# Patient Record
Sex: FEMALE | Race: WHITE | NOT HISPANIC OR LATINO
[De-identification: names, ages, dates, MRNs, and addresses within clinical notes are randomized per-mention and may not be internally consistent; named-entity substitution may affect disease eponyms.]

---

## 2017-01-06 ENCOUNTER — APPOINTMENT (OUTPATIENT)
Dept: UROLOGY | Facility: CLINIC | Age: 82
End: 2017-01-06

## 2017-01-06 VITALS
HEART RATE: 91 BPM | WEIGHT: 170 LBS | RESPIRATION RATE: 17 BRPM | DIASTOLIC BLOOD PRESSURE: 75 MMHG | BODY MASS INDEX: 34.27 KG/M2 | TEMPERATURE: 98 F | SYSTOLIC BLOOD PRESSURE: 161 MMHG | HEIGHT: 59 IN

## 2017-01-06 DIAGNOSIS — F17.200 NICOTINE DEPENDENCE, UNSPECIFIED, UNCOMPLICATED: ICD-10-CM

## 2017-01-06 DIAGNOSIS — Z78.9 OTHER SPECIFIED HEALTH STATUS: ICD-10-CM

## 2017-01-06 DIAGNOSIS — C50.119: ICD-10-CM

## 2017-01-06 DIAGNOSIS — Z86.79 PERSONAL HISTORY OF OTHER DISEASES OF THE CIRCULATORY SYSTEM: ICD-10-CM

## 2017-01-06 DIAGNOSIS — J44.9 CHRONIC OBSTRUCTIVE PULMONARY DISEASE, UNSPECIFIED: ICD-10-CM

## 2017-08-08 ENCOUNTER — APPOINTMENT (OUTPATIENT)
Dept: UROLOGY | Facility: CLINIC | Age: 82
End: 2017-08-08

## 2018-11-11 ENCOUNTER — APPOINTMENT (OUTPATIENT)
Dept: MRI IMAGING | Facility: CLINIC | Age: 83
End: 2018-11-11

## 2018-11-11 ENCOUNTER — OUTPATIENT (OUTPATIENT)
Dept: OUTPATIENT SERVICES | Facility: HOSPITAL | Age: 83
LOS: 1 days | End: 2018-11-11
Payer: COMMERCIAL

## 2018-11-11 DIAGNOSIS — Z00.8 ENCOUNTER FOR OTHER GENERAL EXAMINATION: ICD-10-CM

## 2018-11-11 PROCEDURE — A9585: CPT

## 2018-11-11 PROCEDURE — 74183 MRI ABD W/O CNTR FLWD CNTR: CPT

## 2018-11-11 PROCEDURE — 74183 MRI ABD W/O CNTR FLWD CNTR: CPT | Mod: 26

## 2019-08-29 ENCOUNTER — APPOINTMENT (OUTPATIENT)
Dept: SURGICAL ONCOLOGY | Facility: CLINIC | Age: 84
End: 2019-08-29
Payer: MEDICARE

## 2019-08-29 VITALS
HEIGHT: 59 IN | SYSTOLIC BLOOD PRESSURE: 150 MMHG | BODY MASS INDEX: 35.28 KG/M2 | OXYGEN SATURATION: 88 % | WEIGHT: 175 LBS | DIASTOLIC BLOOD PRESSURE: 68 MMHG | RESPIRATION RATE: 15 BRPM | HEART RATE: 74 BPM

## 2019-08-29 PROCEDURE — 99204 OFFICE O/P NEW MOD 45 MIN: CPT

## 2019-08-30 ENCOUNTER — TRANSCRIPTION ENCOUNTER (OUTPATIENT)
Age: 84
End: 2019-08-30

## 2019-09-02 ENCOUNTER — FORM ENCOUNTER (OUTPATIENT)
Age: 84
End: 2019-09-02

## 2019-09-03 ENCOUNTER — APPOINTMENT (OUTPATIENT)
Dept: ULTRASOUND IMAGING | Facility: CLINIC | Age: 84
End: 2019-09-03
Payer: MEDICARE

## 2019-09-03 ENCOUNTER — APPOINTMENT (OUTPATIENT)
Dept: RADIOLOGY | Facility: CLINIC | Age: 84
End: 2019-09-03
Payer: MEDICARE

## 2019-09-03 ENCOUNTER — OUTPATIENT (OUTPATIENT)
Dept: OUTPATIENT SERVICES | Facility: HOSPITAL | Age: 84
LOS: 1 days | End: 2019-09-03
Payer: COMMERCIAL

## 2019-09-03 DIAGNOSIS — Z00.8 ENCOUNTER FOR OTHER GENERAL EXAMINATION: ICD-10-CM

## 2019-09-03 PROCEDURE — 71046 X-RAY EXAM CHEST 2 VIEWS: CPT

## 2019-09-03 PROCEDURE — 76882 US LMTD JT/FCL EVL NVASC XTR: CPT | Mod: 26,LT

## 2019-09-03 PROCEDURE — 76882 US LMTD JT/FCL EVL NVASC XTR: CPT

## 2019-09-03 PROCEDURE — 71046 X-RAY EXAM CHEST 2 VIEWS: CPT | Mod: 26

## 2019-09-06 ENCOUNTER — FORM ENCOUNTER (OUTPATIENT)
Age: 84
End: 2019-09-06

## 2019-09-07 ENCOUNTER — APPOINTMENT (OUTPATIENT)
Dept: CT IMAGING | Facility: CLINIC | Age: 84
End: 2019-09-07
Payer: MEDICARE

## 2019-09-07 ENCOUNTER — OUTPATIENT (OUTPATIENT)
Dept: OUTPATIENT SERVICES | Facility: HOSPITAL | Age: 84
LOS: 1 days | End: 2019-09-07
Payer: COMMERCIAL

## 2019-09-07 DIAGNOSIS — Z00.8 ENCOUNTER FOR OTHER GENERAL EXAMINATION: ICD-10-CM

## 2019-09-07 PROCEDURE — 82565 ASSAY OF CREATININE: CPT

## 2019-09-07 PROCEDURE — 71260 CT THORAX DX C+: CPT | Mod: 26

## 2019-09-07 PROCEDURE — 71260 CT THORAX DX C+: CPT

## 2019-09-12 ENCOUNTER — RESULT REVIEW (OUTPATIENT)
Age: 84
End: 2019-09-12

## 2019-09-12 ENCOUNTER — OUTPATIENT (OUTPATIENT)
Dept: OUTPATIENT SERVICES | Facility: HOSPITAL | Age: 84
LOS: 1 days | End: 2019-09-12

## 2019-09-12 DIAGNOSIS — C43.9 MALIGNANT MELANOMA OF SKIN, UNSPECIFIED: ICD-10-CM

## 2019-09-13 LAB — SURGICAL PATHOLOGY STUDY: SIGNIFICANT CHANGE UP

## 2019-09-18 ENCOUNTER — APPOINTMENT (OUTPATIENT)
Dept: PLASTIC SURGERY | Facility: CLINIC | Age: 84
End: 2019-09-18
Payer: MEDICARE

## 2019-09-18 VITALS
HEIGHT: 59 IN | HEART RATE: 71 BPM | SYSTOLIC BLOOD PRESSURE: 154 MMHG | DIASTOLIC BLOOD PRESSURE: 76 MMHG | RESPIRATION RATE: 15 BRPM | BODY MASS INDEX: 34.27 KG/M2 | OXYGEN SATURATION: 98 % | WEIGHT: 170 LBS

## 2019-09-18 PROCEDURE — 99204 OFFICE O/P NEW MOD 45 MIN: CPT

## 2019-09-18 RX ORDER — ASPIRIN 81 MG
81 TABLET,CHEWABLE ORAL
Refills: 0 | Status: DISCONTINUED | COMMUNITY
End: 2019-09-18

## 2019-09-18 NOTE — PHYSICAL EXAM
[de-identified] : The patient is ambulatory, well groomed, no signs of cachexia. [de-identified] : No conjunctival erythema, hyperemia, or discharge bilaterally; no ectropion, entropion, lagophthalmos, or lid malposition bilaterally. Pupils are equal, round, and reactive to light bilaterally. [de-identified] : Normocephalic, atraumatic. [de-identified] : There are no masses, scars, or lesions of the external ear. External nose is midline with no scars or masses. Gross hearing intact bilaterally (finger rub). Nasal mucosa has no edema, lesions, or signs of desiccation. Lips and gums have no masses, lesions, friability, or edema. [de-identified] : Neck is soft without edema, masses, or crepitus. Trachea midline. No thyromegaly; no palpable thyroid nodules. [de-identified] : No swelling, tenderness, or varicosities of the extremities bilaterally; extremities are warm to palpation and pedal pulses intact. [de-identified] : No sign of respiratory distress, no tachypnea, no use of accessory respiratory muscles. [de-identified] : No hepatomegaly or splenomegaly. No abdominal wall tenderness or masses on palpation. No abdominal wall hernias noted. [de-identified] : On examination, patient has normal gait and station.\par  [de-identified] : Left Lower Leg -\par there is a biopsy site with residual pigmentation noted on the anterior medial left lower leg,\par approximately 2 cm in diameter, irregular borders, appears to be raised skin lesion,\par no open areas, no bleeding, no drainage, no satellite lesions, \par no regional LAD noted.\par  [de-identified] : The patient is alert and oriented to time, place, and person. No obvious disorder of mood or affect such as anxiety or agitation. [de-identified] : CN 2-12 are intact, no sensory disturbances noted (e.g. by touch)

## 2019-09-18 NOTE — HISTORY OF PRESENT ILLNESS
[FreeTextEntry1] : This is a 84 year old female who presents to the office for an initial consultation at the request of Dr. 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 Pt reports a mole that she had for a while. A few months ago she noticed crusting and bleeding which didn't improve. This prompted medical attention. Her dermatologist Dr. Braga did a biopsy of the mole and referred her to Dr. Roman. She will be proceeding with wide-excision as well as sentinel LN biopsy with Dr. Roman and was referred to PRS for reconstructive options. \par Pt PMH pertinent for HLD, HTN, COPD. Current everyday smoker. Denies any anticoagulant use. Reports history of left knee replacement in 2006, has swelling and edema of left leg ever since. \par Otherwise no other complaints or concerns; denies fever, sweats, or chills.

## 2019-09-18 NOTE — ASSESSMENT
[FreeTextEntry1] : 84 year old female who presents to the office for an initial consultation at the request of Dr. Roman (Surgical Oncology) regarding recently diagnosed melanoma of the left leg.\par \par I explained that following excision there will be a full thickness defect of the involved area.  The reconstructive options will be based on the defect size and surrounding tissue laxity of the involved area.  Primary closure is only possible for smaller defects.  \par For larger defects, local tissue rearrangement or skin grafting may be necessary.  The patient was explained the risks of each option. Risks following layered primary closure or local tissue rearrangement include wound dehiscence, contour irregularity, bleeding, infection, and paraesthesias.  Risks following skin grafting include wound dehiscence, skin graft nonadherence (partial or complete), contour irregularity, bleeding, infection, paraesthesias, and donor site complications.  We also discussed the importance of cessation of smoking due to increased risk of wound complications following surgery. Patient and the patient's daughter fully understands this discussion. All questions were answered; the patient fully understands the risks and plan and would like to proceed with the above.\par \par Surgery to be planned with Dr. Roman, surgical paperwork to follow.

## 2019-09-18 NOTE — ADDENDUM
[FreeTextEntry1] : All medical record entries made were at my, VERONICA GIMENEZ MD, direction and personally dictated by me. I have reviewed the chart and agree that the record accurately reflects my personal performance of the history, physical exam, assessment, and plan.

## 2019-09-18 NOTE — CONSULT LETTER
[Consult Letter:] : I had the pleasure of evaluating your patient, [unfilled]. [Dear  ___] : Dear  [unfilled], [Please see my note below.] : Please see my note below. [Consult Closing:] : Thank you very much for allowing me to participate in the care of this patient.  If you have any questions, please do not hesitate to contact me. [Sincerely,] : Sincerely, [FreeTextEntry3] : Luis Singh MD

## 2019-10-04 ENCOUNTER — OUTPATIENT (OUTPATIENT)
Dept: OUTPATIENT SERVICES | Facility: HOSPITAL | Age: 84
LOS: 1 days | End: 2019-10-04
Payer: MEDICARE

## 2019-10-04 VITALS
DIASTOLIC BLOOD PRESSURE: 64 MMHG | WEIGHT: 166.01 LBS | RESPIRATION RATE: 14 BRPM | HEIGHT: 56 IN | SYSTOLIC BLOOD PRESSURE: 122 MMHG | HEART RATE: 82 BPM | TEMPERATURE: 97 F | OXYGEN SATURATION: 95 %

## 2019-10-04 DIAGNOSIS — H26.9 UNSPECIFIED CATARACT: Chronic | ICD-10-CM

## 2019-10-04 DIAGNOSIS — C43.9 MALIGNANT MELANOMA OF SKIN, UNSPECIFIED: ICD-10-CM

## 2019-10-04 DIAGNOSIS — R94.31 ABNORMAL ELECTROCARDIOGRAM [ECG] [EKG]: ICD-10-CM

## 2019-10-04 DIAGNOSIS — C50.919 MALIGNANT NEOPLASM OF UNSPECIFIED SITE OF UNSPECIFIED FEMALE BREAST: Chronic | ICD-10-CM

## 2019-10-04 DIAGNOSIS — C43.72 MALIGNANT MELANOMA OF LEFT LOWER LIMB, INCLUDING HIP: ICD-10-CM

## 2019-10-04 DIAGNOSIS — M12.9 ARTHROPATHY, UNSPECIFIED: Chronic | ICD-10-CM

## 2019-10-04 LAB
ANION GAP SERPL CALC-SCNC: 14 MMO/L — SIGNIFICANT CHANGE UP (ref 7–14)
BUN SERPL-MCNC: 22 MG/DL — SIGNIFICANT CHANGE UP (ref 7–23)
CALCIUM SERPL-MCNC: 9.7 MG/DL — SIGNIFICANT CHANGE UP (ref 8.4–10.5)
CHLORIDE SERPL-SCNC: 106 MMOL/L — SIGNIFICANT CHANGE UP (ref 98–107)
CO2 SERPL-SCNC: 25 MMOL/L — SIGNIFICANT CHANGE UP (ref 22–31)
CREAT SERPL-MCNC: 0.93 MG/DL — SIGNIFICANT CHANGE UP (ref 0.5–1.3)
GLUCOSE SERPL-MCNC: 118 MG/DL — HIGH (ref 70–99)
HCT VFR BLD CALC: 46.8 % — HIGH (ref 34.5–45)
HGB BLD-MCNC: 14.1 G/DL — SIGNIFICANT CHANGE UP (ref 11.5–15.5)
MAGNESIUM SERPL-MCNC: 2 MG/DL — SIGNIFICANT CHANGE UP (ref 1.6–2.6)
MCHC RBC-ENTMCNC: 30.1 % — LOW (ref 32–36)
MCHC RBC-ENTMCNC: 30.3 PG — SIGNIFICANT CHANGE UP (ref 27–34)
MCV RBC AUTO: 100.6 FL — HIGH (ref 80–100)
NRBC # FLD: 0 K/UL — SIGNIFICANT CHANGE UP (ref 0–0)
PHOSPHATE SERPL-MCNC: 2.5 MG/DL — SIGNIFICANT CHANGE UP (ref 2.5–4.5)
PLATELET # BLD AUTO: 273 K/UL — SIGNIFICANT CHANGE UP (ref 150–400)
PMV BLD: 9.7 FL — SIGNIFICANT CHANGE UP (ref 7–13)
POTASSIUM SERPL-MCNC: 4.1 MMOL/L — SIGNIFICANT CHANGE UP (ref 3.5–5.3)
POTASSIUM SERPL-SCNC: 4.1 MMOL/L — SIGNIFICANT CHANGE UP (ref 3.5–5.3)
RBC # BLD: 4.65 M/UL — SIGNIFICANT CHANGE UP (ref 3.8–5.2)
RBC # FLD: 13.7 % — SIGNIFICANT CHANGE UP (ref 10.3–14.5)
SODIUM SERPL-SCNC: 145 MMOL/L — SIGNIFICANT CHANGE UP (ref 135–145)
WBC # BLD: 10.57 K/UL — HIGH (ref 3.8–10.5)
WBC # FLD AUTO: 10.57 K/UL — HIGH (ref 3.8–10.5)

## 2019-10-04 PROCEDURE — 93010 ELECTROCARDIOGRAM REPORT: CPT

## 2019-10-04 RX ORDER — SODIUM CHLORIDE 9 MG/ML
1000 INJECTION, SOLUTION INTRAVENOUS
Refills: 0 | Status: DISCONTINUED | OUTPATIENT
Start: 2019-10-18 | End: 2019-11-04

## 2019-10-04 NOTE — H&P PST ADULT - NSICDXPASTMEDICALHX_GEN_ALL_CORE_FT
PAST MEDICAL HISTORY:  Emphysema lung COPD    Hypercholesterolemia     Hypertension     Melanoma     Miscarriage x 2 PAST MEDICAL HISTORY:  Arthritis right knee and shoulders    Emphysema lung COPD    Hypercholesterolemia     Hypertension     Melanoma     Miscarriage x 2

## 2019-10-04 NOTE — H&P PST ADULT - HISTORY OF PRESENT ILLNESS
This is an 83 y/o female who presents with left calf mass. Recently it became symptomatic with crusting and bleeding. Visited MD with subsequent biopsy revealing melanoma. Scheduled for wide excision left calf melanoma left groin sentinel LN biopsy on 10-18-19

## 2019-10-04 NOTE — H&P PST ADULT - ATTENDING COMMENTS
07-Aug-2018 07:23
84-year-old lady with an intermediate thickness melanoma of the upper inner left calf scheduled for appropriate excision, with sentinel node biopsy, and reconstruction.    Her diagnosis was reviewed with her and her daughter in my office, again with them and her son on the morning of operation.    All questions answered, consent on chart

## 2019-10-04 NOTE — H&P PST ADULT - NSICDXPROBLEM_GEN_ALL_CORE_FT
PROBLEM DIAGNOSES  Problem: Melanoma  Assessment and Plan: This is an 83 y/o female who is scheduled for wide excision left calf melanoma, left groin sentinel LN biopsy on 10-18-19    Problem: Abnormal EKG  Assessment and Plan: Await medical evaluation with pcp due to advanced age, abnormal ekg and patient's pulse oximetry 95%  * Await old ekg for comparison from pcp  * Instructed to take normal am dose of   the am of surgery PROBLEM DIAGNOSES  Problem: Abnormal EKG  Assessment and Plan: Await medical evaluation with pcp due to advanced age, abnormal ekg and patient's pulse oximetry 95%  * Await old ekg for comparison from pcp  * Instructed to take normal am dose of ramipril, amlodipine, Symbicort  the am of surgery    Problem: Melanoma  Assessment and Plan: This is an 83 y/o female who is scheduled for wide excision left calf melanoma, left groin sentinel LN biopsy on 10-18-19

## 2019-10-04 NOTE — H&P PST ADULT - NSICDXPASTSURGICALHX_GEN_ALL_CORE_FT
PAST SURGICAL HISTORY:  Arthropathy left knee replacement in 2006    Bilateral cataracts with lenses in 2009    Breast cancer left  breast -- lumpectomy in 2004 -- received RT after surgery -- no chemotherapy and taking arimadex for 5 years

## 2019-10-17 ENCOUNTER — TRANSCRIPTION ENCOUNTER (OUTPATIENT)
Age: 84
End: 2019-10-17

## 2019-10-17 ENCOUNTER — FORM ENCOUNTER (OUTPATIENT)
Age: 84
End: 2019-10-17

## 2019-10-17 NOTE — ASU PATIENT PROFILE, ADULT - PMH
Arthritis  right knee and shoulders  Emphysema lung  COPD  Hypercholesterolemia    Hypertension    Melanoma    Miscarriage  x 2

## 2019-10-17 NOTE — ASU PATIENT PROFILE, ADULT - PSH
Arthropathy  left knee replacement in 2006  Bilateral cataracts  with lenses in 2009  Breast cancer  left  breast -- lumpectomy in 2004 -- received RT after surgery -- no chemotherapy and taking arimadex for 5 years

## 2019-10-18 ENCOUNTER — APPOINTMENT (OUTPATIENT)
Dept: NUCLEAR MEDICINE | Facility: HOSPITAL | Age: 84
End: 2019-10-18

## 2019-10-18 ENCOUNTER — OUTPATIENT (OUTPATIENT)
Dept: OUTPATIENT SERVICES | Facility: HOSPITAL | Age: 84
LOS: 1 days | Discharge: HOME | End: 2019-10-18
Payer: COMMERCIAL

## 2019-10-18 ENCOUNTER — APPOINTMENT (OUTPATIENT)
Dept: SURGICAL ONCOLOGY | Facility: HOSPITAL | Age: 84
End: 2019-10-18

## 2019-10-18 ENCOUNTER — RESULT REVIEW (OUTPATIENT)
Age: 84
End: 2019-10-18

## 2019-10-18 VITALS
DIASTOLIC BLOOD PRESSURE: 62 MMHG | SYSTOLIC BLOOD PRESSURE: 126 MMHG | HEART RATE: 76 BPM | RESPIRATION RATE: 22 BRPM | OXYGEN SATURATION: 93 %

## 2019-10-18 VITALS
OXYGEN SATURATION: 96 % | WEIGHT: 166.01 LBS | DIASTOLIC BLOOD PRESSURE: 56 MMHG | HEIGHT: 56 IN | TEMPERATURE: 98 F | RESPIRATION RATE: 15 BRPM | HEART RATE: 69 BPM | SYSTOLIC BLOOD PRESSURE: 152 MMHG

## 2019-10-18 DIAGNOSIS — C50.919 MALIGNANT NEOPLASM OF UNSPECIFIED SITE OF UNSPECIFIED FEMALE BREAST: Chronic | ICD-10-CM

## 2019-10-18 DIAGNOSIS — C43.72 MALIGNANT MELANOMA OF LEFT LOWER LIMB, INCLUDING HIP: ICD-10-CM

## 2019-10-18 DIAGNOSIS — M12.9 ARTHROPATHY, UNSPECIFIED: Chronic | ICD-10-CM

## 2019-10-18 DIAGNOSIS — H26.9 UNSPECIFIED CATARACT: Chronic | ICD-10-CM

## 2019-10-18 PROCEDURE — 78195 LYMPH SYSTEM IMAGING: CPT | Mod: 26

## 2019-10-18 PROCEDURE — 15221 FTH/GFT FR S/A/L EACH ADDL: CPT

## 2019-10-18 PROCEDURE — 15220 FTH/GFT FR S/A/L 20 SQ CM/<: CPT

## 2019-10-18 PROCEDURE — 88342 IMHCHEM/IMCYTCHM 1ST ANTB: CPT | Mod: 26

## 2019-10-18 PROCEDURE — 88307 TISSUE EXAM BY PATHOLOGIST: CPT | Mod: 26

## 2019-10-18 PROCEDURE — 88341 IMHCHEM/IMCYTCHM EA ADD ANTB: CPT | Mod: 26

## 2019-10-18 PROCEDURE — 13101 CMPLX RPR TRUNK 2.6-7.5 CM: CPT | Mod: 59

## 2019-10-18 PROCEDURE — 38500 BIOPSY/REMOVAL LYMPH NODES: CPT | Mod: LT

## 2019-10-18 PROCEDURE — 38792 RA TRACER ID OF SENTINL NODE: CPT | Mod: 59,LT

## 2019-10-18 PROCEDURE — 11606 EXC TR-EXT MAL+MARG >4 CM: CPT

## 2019-10-18 PROCEDURE — 88305 TISSUE EXAM BY PATHOLOGIST: CPT | Mod: 26

## 2019-10-18 RX ORDER — FENTANYL CITRATE 50 UG/ML
25 INJECTION INTRAVENOUS
Refills: 0 | Status: DISCONTINUED | OUTPATIENT
Start: 2019-10-18 | End: 2019-10-18

## 2019-10-18 RX ORDER — METOCLOPRAMIDE HCL 10 MG
10 TABLET ORAL ONCE
Refills: 0 | Status: DISCONTINUED | OUTPATIENT
Start: 2019-10-18 | End: 2019-11-04

## 2019-10-18 NOTE — ASU DISCHARGE PLAN (ADULT/PEDIATRIC) - CARE PROVIDER_API CALL
Luis Singh (MD)  ColonRectal Surgery; Plastic Surgery; Surgery; Surgery of the Hand  [address] [place] [address], [address] [address]  Phone: [phone]  Fax: [phone]  Follow Up Time: 1 week    Yunier Roman)  Surgery  [address] [place] [address]  Phone: [phone]  Fax: [phone]  Follow Up Time: 2 weeks

## 2019-10-18 NOTE — BRIEF OPERATIVE NOTE - NSICDXBRIEFPROCEDURE_GEN_ALL_CORE_FT
PROCEDURES:  Complex repair of skin of lower extremity 2.6 cm to 7.5 cm 18-Oct-2019 19:13:34  Rick Hopkins  Complex repair of trunk, each additional 5 cm or less 18-Oct-2019 19:13:13  Rick Hopkins  Repair, trunk, complex, each additional 5 cm or less 18-Oct-2019 19:12:35  Rick Hopkins  Complex repair, wound, torso, inital 7.5 cm, followed by additional 5.0 cm repair 18-Oct-2019 19:12:17  Rick Hopkins  Full-thickness skin graft to left lower extremity 18-Oct-2019 19:10:22  Rick Hopkins  Excision, lesion, malignant, torso, upper extremity, or lower extremity, greater than 4.0 cm in diameter 18-Oct-2019 19:09:53  Rick Hopkins

## 2019-10-18 NOTE — ASU DISCHARGE PLAN (ADULT/PEDIATRIC) - ASU DC SPECIAL INSTRUCTIONSFT
Initial followup with plastic surgery within the next week or 2.    Dr. Roman should call with pathology report approximately 14 days, that conversation will determine further management Initial followup with plastic surgery within the next week or 2.    Dr. Roman should call with pathology report approximately 14 days, that conversation will determine further management    Resume normal diet. Avoid straining, exercise, or heavy lifting.    Take medications as instructed by prescriptions.    Shower/rinse with warm/soapy water, pat dry (NO scrubbing or rubbing). Keep left lower extremity ace wrap in place and dry. Do not remove. Exclude from shower.    Follow-up with primary care doctor as well.

## 2019-10-18 NOTE — ASU DISCHARGE PLAN (ADULT/PEDIATRIC) - NURSING INSTRUCTIONS
Advance to regular diet as tolerated, stay well hydrated.    You received Tylenol (acetaminophen) in the OR.  Do not take Tylenol or any medications that contain Tylenol until after Midnight tonight. Advance to regular diet as tolerated, stay well hydrated.    You received Tylenol (acetaminophen) in the OR.  Do not take Tylenol or any medications that contain Tylenol until after Midnight tonight

## 2019-10-18 NOTE — ASU DISCHARGE PLAN (ADULT/PEDIATRIC) - PROVIDER TOKENS
PROVIDER:[TOKEN:[[id number]],FOLLOWUP:[1 week]],PROVIDER:[TOKEN:[[id number]],FOLLOWUP:[2 weeks]]

## 2019-10-18 NOTE — CHART NOTE - NSCHARTNOTEFT_GEN_A_CORE
Surgery Preop Note    Patient is a 84y old  Female who presents with a chief complaint of   Diagnosis: left calf melanoma T3b. 3.22mm thickness without regression changes   Procedure: WIDE EXCISION LEFT CALF MELANOMA, LEFT Cho  GROIN SENTINEL LYMPH NODE BIOPSY DR GIMENEZ  RECONSTRUCTION OF LEFT LOWER LEG WOUND,  CLOSURE OF LEFT GROIN WOUND, POSSIBLE SKIN  GRAFT, POSSIBLE LOCAL FLAP  Surgeon: Dr. Roman/Dr. Gimenez    Vital Signs Last 24 Hrs  T(C): 36.6 (18 Oct 2019 08:49), Max: 36.6 (18 Oct 2019 08:49)  T(F): 97.9 (18 Oct 2019 08:49), Max: 97.9 (18 Oct 2019 08:49)  HR: 69 (18 Oct 2019 08:49) (69 - 69)  BP: 152/56 (18 Oct 2019 08:49) (152/56 - 152/56)  BP(mean): --  RR: 15 (18 Oct 2019 08:49) (15 - 15)  SpO2: 96% (18 Oct 2019 08:49) (96% - 96%)    Exam:  Gen: NAD, resting in bed, alert and responding appropriately  Resp: Airway patent, non-labored respirations  Neuro: AAOx3  LLE: approx. 2cm raised lesion of left upper calf with ulceration       Assessment & Plan:  84y Female with left calf melanoma T3b. 3.22mm thickness without regression changes     - wide local excision with 2cm margins and sentinal node biopsy   - soft tissue reconstruction with plastic surgery

## 2019-10-18 NOTE — BRIEF OPERATIVE NOTE - OPERATION/FINDINGS
3.2 mm melanoma of the upper inner left calf Excised with a minimum 2 cm margin, inguinal sentinel node biopsy, and reconstruction.

## 2019-10-18 NOTE — ASU DISCHARGE PLAN (ADULT/PEDIATRIC) - FOLLOW UP APPOINTMENTS
Dr Roman: [phone] JEREMY TORREZ (Adult):/ [phone]  may also call Recovery Room (PACU)  @ [phone]

## 2019-10-18 NOTE — ASU DISCHARGE PLAN (ADULT/PEDIATRIC) - CALL YOUR DOCTOR IF YOU HAVE ANY OF THE FOLLOWING:
Increased irritability or sluggishness/Excessive diarrhea/Numbness, tingling, color or temperature change to extremity/Nausea and vomiting that does not stop/Unable to urinate/Inability to tolerate liquids or foods/Pain not relieved by Medications/Wound/Surgical Site with redness, or foul smelling discharge or pus/Bleeding that does not stop/Swelling that gets worse Nausea and vomiting that does not stop/Unable to urinate/Wound/Surgical Site with redness, or foul smelling discharge or pus/Increased irritability or sluggishness/Inability to tolerate liquids or foods/Excessive diarrhea/Numbness, tingling, color or temperature change to extremity/Pain not relieved by Medications/Bleeding that does not stop/Swelling that gets worse/Fever greater than (need to indicate Fahrenheit or Celsius)

## 2019-10-18 NOTE — PACU DISCHARGE NOTE - PAIN:
----- Message from Jodee Boo sent at 1/4/2019 10:29 AM CST -----  Contact: Pt:[phone]  .Needs Advice    Reason for call:Pt called and states he would like to speak with Stefani RN in regards to some questions he has.         Communication Preference:Pt:[phone]    Additional Information:    
Called pt and scheduled his appts per his request. Pt verbalized understanding to all.  
Controlled with current regime

## 2019-10-22 PROBLEM — C43.9 MALIGNANT MELANOMA OF SKIN, UNSPECIFIED: Chronic | Status: ACTIVE | Noted: 2019-10-04

## 2019-10-22 PROBLEM — M19.90 UNSPECIFIED OSTEOARTHRITIS, UNSPECIFIED SITE: Chronic | Status: ACTIVE | Noted: 2019-10-04

## 2019-10-22 PROBLEM — J43.9 EMPHYSEMA, UNSPECIFIED: Chronic | Status: ACTIVE | Noted: 2019-10-04

## 2019-10-22 PROBLEM — O03.9 COMPLETE OR UNSPECIFIED SPONTANEOUS ABORTION WITHOUT COMPLICATION: Chronic | Status: ACTIVE | Noted: 2019-10-04

## 2019-10-22 PROBLEM — E78.00 PURE HYPERCHOLESTEROLEMIA, UNSPECIFIED: Chronic | Status: ACTIVE | Noted: 2019-10-04

## 2019-10-23 ENCOUNTER — APPOINTMENT (OUTPATIENT)
Dept: PLASTIC SURGERY | Facility: CLINIC | Age: 84
End: 2019-10-23
Payer: MEDICARE

## 2019-10-23 PROCEDURE — 99024 POSTOP FOLLOW-UP VISIT: CPT

## 2019-10-29 ENCOUNTER — APPOINTMENT (OUTPATIENT)
Dept: PLASTIC SURGERY | Facility: CLINIC | Age: 84
End: 2019-10-29
Payer: MEDICARE

## 2019-10-29 PROCEDURE — 99024 POSTOP FOLLOW-UP VISIT: CPT

## 2019-11-06 LAB — SURGICAL PATHOLOGY STUDY: SIGNIFICANT CHANGE UP

## 2019-11-07 ENCOUNTER — APPOINTMENT (OUTPATIENT)
Dept: PLASTIC SURGERY | Facility: CLINIC | Age: 84
End: 2019-11-07
Payer: MEDICARE

## 2019-11-07 PROCEDURE — 99024 POSTOP FOLLOW-UP VISIT: CPT

## 2019-11-14 ENCOUNTER — APPOINTMENT (OUTPATIENT)
Dept: PLASTIC SURGERY | Facility: CLINIC | Age: 84
End: 2019-11-14

## 2019-11-14 NOTE — REASON FOR VISIT
[Post Op: _________] : a [unfilled] post op visit [FreeTextEntry1] : DOS: 10/18/2019 s/p LLE FTSG reconstruction following melanoma excision. Pt states she is doing better with time.

## 2019-11-21 ENCOUNTER — APPOINTMENT (OUTPATIENT)
Dept: PLASTIC SURGERY | Facility: CLINIC | Age: 84
End: 2019-11-21
Payer: MEDICARE

## 2019-11-21 PROCEDURE — 99024 POSTOP FOLLOW-UP VISIT: CPT

## 2019-12-13 ENCOUNTER — APPOINTMENT (OUTPATIENT)
Dept: PLASTIC SURGERY | Facility: CLINIC | Age: 84
End: 2019-12-13

## 2019-12-13 ENCOUNTER — APPOINTMENT (OUTPATIENT)
Dept: PLASTIC SURGERY | Facility: CLINIC | Age: 84
End: 2019-12-13
Payer: MEDICARE

## 2019-12-13 PROCEDURE — 99024 POSTOP FOLLOW-UP VISIT: CPT

## 2020-01-07 ENCOUNTER — APPOINTMENT (OUTPATIENT)
Dept: PLASTIC SURGERY | Facility: CLINIC | Age: 85
End: 2020-01-07
Payer: MEDICARE

## 2020-01-07 PROCEDURE — 99024 POSTOP FOLLOW-UP VISIT: CPT

## 2020-01-13 ENCOUNTER — FORM ENCOUNTER (OUTPATIENT)
Age: 85
End: 2020-01-13

## 2020-01-14 ENCOUNTER — OUTPATIENT (OUTPATIENT)
Dept: OUTPATIENT SERVICES | Facility: HOSPITAL | Age: 85
LOS: 1 days | End: 2020-01-14
Payer: COMMERCIAL

## 2020-01-14 ENCOUNTER — APPOINTMENT (OUTPATIENT)
Dept: CT IMAGING | Facility: CLINIC | Age: 85
End: 2020-01-14
Payer: MEDICARE

## 2020-01-14 DIAGNOSIS — C43.72 MALIGNANT MELANOMA OF LEFT LOWER LIMB, INCLUDING HIP: ICD-10-CM

## 2020-01-14 DIAGNOSIS — M12.9 ARTHROPATHY, UNSPECIFIED: Chronic | ICD-10-CM

## 2020-01-14 DIAGNOSIS — C50.919 MALIGNANT NEOPLASM OF UNSPECIFIED SITE OF UNSPECIFIED FEMALE BREAST: Chronic | ICD-10-CM

## 2020-01-14 DIAGNOSIS — H26.9 UNSPECIFIED CATARACT: Chronic | ICD-10-CM

## 2020-01-14 PROCEDURE — 71250 CT THORAX DX C-: CPT | Mod: 26

## 2020-01-14 PROCEDURE — 71250 CT THORAX DX C-: CPT

## 2020-01-23 ENCOUNTER — FORM ENCOUNTER (OUTPATIENT)
Age: 85
End: 2020-01-23

## 2020-01-24 ENCOUNTER — OUTPATIENT (OUTPATIENT)
Dept: OUTPATIENT SERVICES | Facility: HOSPITAL | Age: 85
LOS: 1 days | End: 2020-01-24
Payer: COMMERCIAL

## 2020-01-24 ENCOUNTER — APPOINTMENT (OUTPATIENT)
Dept: NUCLEAR MEDICINE | Facility: CLINIC | Age: 85
End: 2020-01-24
Payer: MEDICARE

## 2020-01-24 DIAGNOSIS — M12.9 ARTHROPATHY, UNSPECIFIED: Chronic | ICD-10-CM

## 2020-01-24 DIAGNOSIS — C50.919 MALIGNANT NEOPLASM OF UNSPECIFIED SITE OF UNSPECIFIED FEMALE BREAST: Chronic | ICD-10-CM

## 2020-01-24 DIAGNOSIS — H26.9 UNSPECIFIED CATARACT: Chronic | ICD-10-CM

## 2020-01-24 DIAGNOSIS — C43.72 MALIGNANT MELANOMA OF LEFT LOWER LIMB, INCLUDING HIP: ICD-10-CM

## 2020-01-24 PROCEDURE — 78816 PET IMAGE W/CT FULL BODY: CPT

## 2020-01-24 PROCEDURE — A9552: CPT

## 2020-01-24 PROCEDURE — 78816 PET IMAGE W/CT FULL BODY: CPT | Mod: 26,PS

## 2020-01-27 NOTE — PHYSICAL EXAM
[Normal] : supple, no neck mass and thyroid not enlarged [Normal Neck Lymph Nodes] : normal neck lymph nodes  [Normal Supraclavicular Lymph Nodes] : normal supraclavicular lymph nodes [Normal Axillary Lymph Nodes] : normal axillary lymph nodes [Normal Groin Lymph Nodes] : normal groin lymph nodes [Normal] : full range of motion and no deformities appreciated [de-identified] : Below

## 2020-01-27 NOTE — HISTORY OF PRESENT ILLNESS
[de-identified] : 84-year-old lady referred by her dermatologist Dr. Sanjana CANAS with an ulcerated, > 3.2 mm melanoma of the LEFT inner LEG.\par \par Concomitant dorsal right hand in situ [facility] va be rx'd with Mohs by Dr Miky Parks\par \par No other prior personal hx of skin cancer\par \par +LEFT BREAST CANCER - 2002; breast cons op by Dr Christa Marion @[facility]\par +XRT @[facility]\par +Arimidex x 5 yr, Dr Nayak @[facility]\par \par No other personal hx of malignancy\par \par The only relative with a history of cancer is a son with lymphoma\par \par PMD:\par Dr Yolanda SHIN\par \par +COPD\par She uses Ventolin, Albuterol and Symbicort inhalers - NO pulm.\par \par .\par Blood pressure controlled with amlodipine and ramipril.\par Lovastatin for hyperlipidemia. - no cardiol\par \par +L. TKR\par \par Last visit to gynecologist ~2014, does not recall the doctor's name.\par \par Summer 2019 mammogram was reportedly normal\par + Personal history of breast cancer (above)\par \par Most recent eye examination: ~2017, again does not recall the name.\par 2002: [facility] cataracts\par \par Last colonoscopy was at ~age 80.\par \par \par \par 2017: 3.2 cm left adrenal mass diagnosed on imaging @[facility].\par In retrospect, present and unchanged on an abdominal MRI from August 2011.\par Seen by Dr. Omari Walker (urology), clinical followup recommended...................\par Endocrine: Dr Sandrine QUEVEDO\par \par \par \par

## 2020-01-27 NOTE — REASON FOR VISIT
[Initial Consultation] : an initial consultation for [Other: _____] : [unfilled] [FreeTextEntry2] : Intermediate thickness melanoma left leg

## 2020-01-27 NOTE — REVIEW OF SYSTEMS
[Negative] : Endocrine [FreeTextEntry3] : Cataract [FreeTextEntry5] : hypertension [FreeTextEntry6] : COPD [FreeTextEntry9] : adrenal mass [de-identified] : Arthritis [de-identified] : Melanoma [FreeTextEntry1] : breast cancer

## 2020-01-27 NOTE — ASSESSMENT
[FreeTextEntry1] : Discussion regarding newly diagnosed ulcerated, it is normal, left leg.\par \par Extent of disease evaluation should include:\par Chest x-ray.\par Left inguinal ultrasound.\par They agree, and understand; prescriptions entered\par \par Appropriate treatment would be excision with 2 cm margins, sentinel node biopsy, coordinated with plastic surgery.\par They would like to proceed with operation, paperwork submitted\par \par Reviewed in detail, all questions answered.\par \par (09-04-19:\par Patient and I spoke.\par #1. Left inguinal ultrasound yesterday shows no adenopathy.\par #2. Accompanying chest x-ray demonstrates a right hilar prominence, AND a 1.5 cm nodular opacity projecting over the posterior inferior heart on the lateral image.\par #3. CT chest recommended, with IV contrast, to clarify above.\par She understands and agrees.\par Prescription entered).\par \par (09-11-19.\par We spoke.\par September 7, 2019, she had a CT chest at [facility] to evaluate findings on her preoperative chest x-ray.\par Results:\par #1. Patchy ground-glass opacity in the RUL, 3 month followup recommended.\par #2. 0.6 cm solid nodule, RML, indeterminate.\par I suggested a thoracic surgery evaluation, given her current melanoma diagnosis, and history of breast cancer.\par She does not want to pursue this avenue, or pulmonary medicine.\par She wants to wait until January 2020 to repeat the CT chest.\par Prescription entered)\par \par (11-20-90.\par We spoke.\par October 18, 2019, she had wide excision of a 3.2 mm ulcerated melanoma from her upper and her left calf with negative margins, negative sentinel node biopsy, and reconstruction by Dr. Singh.\par She's requiring weekly visits to plastic surgery for the calf, but is healing gradually.\par No further intervention presently warranted.\par My office will call to schedule a postoperative visit.\par Note dictated).\par \par \par \par Note dictated\par \par \par 01-16-20.\par Her January 14, 2016, followup CT chest @[facility]:\par From September 2019, right upper lobe nodule which was 1.1 cm x 1.4 cm.\par Stable 5 mm nodule in the right middle lobe.\par Given her history of melanoma, I am arranging for a PET scan, prescription entered.\par I will call her daughter tomorrow.\par \par \par 01-27-20:\par Her daughter (Rosina Feng: [phone]) and I spoke.\par January 24, 2020 PET scan describes the right upper lobe nodule as a 1.4 cm groundglass nodule, slightly increased compared to 2016, possible low-grade primary lung malignancy.\par I am arranging for thoracic surgery consultation for further evaluation, nurses contacted

## 2020-02-06 ENCOUNTER — APPOINTMENT (OUTPATIENT)
Dept: PLASTIC SURGERY | Facility: CLINIC | Age: 85
End: 2020-02-06
Payer: MEDICARE

## 2020-02-06 PROCEDURE — 99211 OFF/OP EST MAY X REQ PHY/QHP: CPT

## 2020-02-07 NOTE — REVIEW OF SYSTEMS
[As Noted in HPI] : as noted in HPI [FreeTextEntry9] : history of left knee replacement [Negative] : Heme/Lymph

## 2020-03-05 ENCOUNTER — APPOINTMENT (OUTPATIENT)
Dept: PLASTIC SURGERY | Facility: CLINIC | Age: 85
End: 2020-03-05
Payer: MEDICARE

## 2020-03-05 DIAGNOSIS — C43.72 MALIGNANT MELANOMA OF LEFT LOWER LIMB, INCLUDING HIP: ICD-10-CM

## 2020-03-05 DIAGNOSIS — T14.8XXA OTHER INJURY OF UNSPECIFIED BODY REGION, INITIAL ENCOUNTER: ICD-10-CM

## 2020-03-05 PROCEDURE — 99212 OFFICE O/P EST SF 10 MIN: CPT

## 2020-03-05 NOTE — REVIEW OF SYSTEMS
[As Noted in HPI] : as noted in HPI [Negative] : Heme/Lymph [FreeTextEntry9] : history of left knee replacement

## 2020-05-07 ENCOUNTER — INPATIENT (INPATIENT)
Facility: HOSPITAL | Age: 85
LOS: 1 days | Discharge: HOME | DRG: 66 | End: 2020-05-09
Attending: FAMILY MEDICINE | Admitting: FAMILY MEDICINE
Payer: COMMERCIAL

## 2020-05-07 VITALS
OXYGEN SATURATION: 93 % | TEMPERATURE: 98 F | RESPIRATION RATE: 20 BRPM | DIASTOLIC BLOOD PRESSURE: 76 MMHG | WEIGHT: 149.91 LBS | HEART RATE: 87 BPM | SYSTOLIC BLOOD PRESSURE: 134 MMHG

## 2020-05-07 DIAGNOSIS — C50.919 MALIGNANT NEOPLASM OF UNSPECIFIED SITE OF UNSPECIFIED FEMALE BREAST: Chronic | ICD-10-CM

## 2020-05-07 DIAGNOSIS — M12.9 ARTHROPATHY, UNSPECIFIED: Chronic | ICD-10-CM

## 2020-05-07 DIAGNOSIS — H26.9 UNSPECIFIED CATARACT: Chronic | ICD-10-CM

## 2020-05-07 DIAGNOSIS — C43.9 MALIGNANT MELANOMA OF SKIN, UNSPECIFIED: ICD-10-CM

## 2020-05-07 DIAGNOSIS — I63.9 CEREBRAL INFARCTION, UNSPECIFIED: ICD-10-CM

## 2020-05-07 DIAGNOSIS — Z29.9 ENCOUNTER FOR PROPHYLACTIC MEASURES, UNSPECIFIED: ICD-10-CM

## 2020-05-07 DIAGNOSIS — E78.00 PURE HYPERCHOLESTEROLEMIA, UNSPECIFIED: ICD-10-CM

## 2020-05-07 DIAGNOSIS — J43.9 EMPHYSEMA, UNSPECIFIED: ICD-10-CM

## 2020-05-07 DIAGNOSIS — I10 ESSENTIAL (PRIMARY) HYPERTENSION: ICD-10-CM

## 2020-05-07 LAB
ALBUMIN SERPL ELPH-MCNC: 3.2 G/DL — LOW (ref 3.3–5)
ALP SERPL-CCNC: 94 U/L — SIGNIFICANT CHANGE UP (ref 40–120)
ALT FLD-CCNC: 16 U/L — SIGNIFICANT CHANGE UP (ref 12–78)
ANION GAP SERPL CALC-SCNC: 2 MMOL/L — LOW (ref 5–17)
APPEARANCE UR: ABNORMAL
AST SERPL-CCNC: 14 U/L — LOW (ref 15–37)
BACTERIA # UR AUTO: ABNORMAL
BASOPHILS # BLD AUTO: 0.08 K/UL — SIGNIFICANT CHANGE UP (ref 0–0.2)
BASOPHILS NFR BLD AUTO: 1.1 % — SIGNIFICANT CHANGE UP (ref 0–2)
BILIRUB SERPL-MCNC: 0.4 MG/DL — SIGNIFICANT CHANGE UP (ref 0.2–1.2)
BILIRUB UR-MCNC: NEGATIVE — SIGNIFICANT CHANGE UP
BUN SERPL-MCNC: 18 MG/DL — SIGNIFICANT CHANGE UP (ref 7–23)
CALCIUM SERPL-MCNC: 8.9 MG/DL — SIGNIFICANT CHANGE UP (ref 8.5–10.1)
CHLORIDE SERPL-SCNC: 107 MMOL/L — SIGNIFICANT CHANGE UP (ref 96–108)
CO2 SERPL-SCNC: 32 MMOL/L — HIGH (ref 22–31)
COLOR SPEC: YELLOW — SIGNIFICANT CHANGE UP
COMMENT - URINE: SIGNIFICANT CHANGE UP
CREAT SERPL-MCNC: 0.88 MG/DL — SIGNIFICANT CHANGE UP (ref 0.5–1.3)
DIFF PNL FLD: ABNORMAL
EOSINOPHIL # BLD AUTO: 0.06 K/UL — SIGNIFICANT CHANGE UP (ref 0–0.5)
EOSINOPHIL NFR BLD AUTO: 0.8 % — SIGNIFICANT CHANGE UP (ref 0–6)
EPI CELLS # UR: SIGNIFICANT CHANGE UP
GLUCOSE SERPL-MCNC: 96 MG/DL — SIGNIFICANT CHANGE UP (ref 70–99)
GLUCOSE UR QL: NEGATIVE — SIGNIFICANT CHANGE UP
HCT VFR BLD CALC: 47.9 % — HIGH (ref 34.5–45)
HGB BLD-MCNC: 15 G/DL — SIGNIFICANT CHANGE UP (ref 11.5–15.5)
HYALINE CASTS # UR AUTO: ABNORMAL /LPF
IMM GRANULOCYTES NFR BLD AUTO: 0.3 % — SIGNIFICANT CHANGE UP (ref 0–1.5)
KETONES UR-MCNC: NEGATIVE — SIGNIFICANT CHANGE UP
LEUKOCYTE ESTERASE UR-ACNC: NEGATIVE — SIGNIFICANT CHANGE UP
LYMPHOCYTES # BLD AUTO: 1.75 K/UL — SIGNIFICANT CHANGE UP (ref 1–3.3)
LYMPHOCYTES # BLD AUTO: 24.4 % — SIGNIFICANT CHANGE UP (ref 13–44)
MCHC RBC-ENTMCNC: 31.3 GM/DL — LOW (ref 32–36)
MCHC RBC-ENTMCNC: 31.5 PG — SIGNIFICANT CHANGE UP (ref 27–34)
MCV RBC AUTO: 100.6 FL — HIGH (ref 80–100)
MONOCYTES # BLD AUTO: 0.43 K/UL — SIGNIFICANT CHANGE UP (ref 0–0.9)
MONOCYTES NFR BLD AUTO: 6 % — SIGNIFICANT CHANGE UP (ref 2–14)
NEUTROPHILS # BLD AUTO: 4.84 K/UL — SIGNIFICANT CHANGE UP (ref 1.8–7.4)
NEUTROPHILS NFR BLD AUTO: 67.4 % — SIGNIFICANT CHANGE UP (ref 43–77)
NITRITE UR-MCNC: NEGATIVE — SIGNIFICANT CHANGE UP
NRBC # BLD: 0 /100 WBCS — SIGNIFICANT CHANGE UP (ref 0–0)
PH UR: 5 — SIGNIFICANT CHANGE UP (ref 5–8)
PLATELET # BLD AUTO: 219 K/UL — SIGNIFICANT CHANGE UP (ref 150–400)
POTASSIUM SERPL-MCNC: 4.5 MMOL/L — SIGNIFICANT CHANGE UP (ref 3.5–5.3)
POTASSIUM SERPL-SCNC: 4.5 MMOL/L — SIGNIFICANT CHANGE UP (ref 3.5–5.3)
PROT SERPL-MCNC: 7.2 G/DL — SIGNIFICANT CHANGE UP (ref 6–8.3)
PROT UR-MCNC: 30 MG/DL
RBC # BLD: 4.76 M/UL — SIGNIFICANT CHANGE UP (ref 3.8–5.2)
RBC # FLD: 15.3 % — HIGH (ref 10.3–14.5)
RBC CASTS # UR COMP ASSIST: SIGNIFICANT CHANGE UP /HPF (ref 0–4)
SARS-COV-2 RNA SPEC QL NAA+PROBE: SIGNIFICANT CHANGE UP
SODIUM SERPL-SCNC: 141 MMOL/L — SIGNIFICANT CHANGE UP (ref 135–145)
SP GR SPEC: 1.01 — SIGNIFICANT CHANGE UP (ref 1.01–1.02)
UROBILINOGEN FLD QL: NEGATIVE — SIGNIFICANT CHANGE UP
WBC # BLD: 7.18 K/UL — SIGNIFICANT CHANGE UP (ref 3.8–10.5)
WBC # FLD AUTO: 7.18 K/UL — SIGNIFICANT CHANGE UP (ref 3.8–10.5)
WBC UR QL: SIGNIFICANT CHANGE UP

## 2020-05-07 PROCEDURE — 70450 CT HEAD/BRAIN W/O DYE: CPT | Mod: 26

## 2020-05-07 PROCEDURE — 70551 MRI BRAIN STEM W/O DYE: CPT | Mod: 26

## 2020-05-07 PROCEDURE — 99285 EMERGENCY DEPT VISIT HI MDM: CPT

## 2020-05-07 PROCEDURE — 70544 MR ANGIOGRAPHY HEAD W/O DYE: CPT | Mod: 26,59

## 2020-05-07 PROCEDURE — 71045 X-RAY EXAM CHEST 1 VIEW: CPT | Mod: 26

## 2020-05-07 PROCEDURE — 93010 ELECTROCARDIOGRAM REPORT: CPT

## 2020-05-07 PROCEDURE — 99223 1ST HOSP IP/OBS HIGH 75: CPT | Mod: GC

## 2020-05-07 PROCEDURE — 99223 1ST HOSP IP/OBS HIGH 75: CPT

## 2020-05-07 RX ORDER — ASPIRIN/CALCIUM CARB/MAGNESIUM 324 MG
81 TABLET ORAL ONCE
Refills: 0 | Status: DISCONTINUED | OUTPATIENT
Start: 2020-05-07 | End: 2020-05-07

## 2020-05-07 RX ORDER — BUDESONIDE AND FORMOTEROL FUMARATE DIHYDRATE 160; 4.5 UG/1; UG/1
2 AEROSOL RESPIRATORY (INHALATION)
Refills: 0 | Status: DISCONTINUED | OUTPATIENT
Start: 2020-05-07 | End: 2020-05-09

## 2020-05-07 RX ORDER — CHOLECALCIFEROL (VITAMIN D3) 125 MCG
1 CAPSULE ORAL
Qty: 0 | Refills: 0 | DISCHARGE

## 2020-05-07 RX ORDER — AMLODIPINE BESYLATE 2.5 MG/1
10 TABLET ORAL DAILY
Refills: 0 | Status: DISCONTINUED | OUTPATIENT
Start: 2020-05-07 | End: 2020-05-08

## 2020-05-07 RX ORDER — ACETAMINOPHEN 500 MG
2 TABLET ORAL
Qty: 0 | Refills: 0 | DISCHARGE

## 2020-05-07 RX ORDER — ASPIRIN/CALCIUM CARB/MAGNESIUM 324 MG
325 TABLET ORAL DAILY
Refills: 0 | Status: DISCONTINUED | OUTPATIENT
Start: 2020-05-07 | End: 2020-05-09

## 2020-05-07 RX ORDER — SIMVASTATIN 20 MG/1
40 TABLET, FILM COATED ORAL AT BEDTIME
Refills: 0 | Status: DISCONTINUED | OUTPATIENT
Start: 2020-05-07 | End: 2020-05-09

## 2020-05-07 RX ORDER — IBUPROFEN 200 MG
1 TABLET ORAL
Qty: 0 | Refills: 0 | DISCHARGE

## 2020-05-07 RX ORDER — ALBUTEROL 90 UG/1
2 AEROSOL, METERED ORAL EVERY 12 HOURS
Refills: 0 | Status: DISCONTINUED | OUTPATIENT
Start: 2020-05-07 | End: 2020-05-09

## 2020-05-07 RX ORDER — LISINOPRIL 2.5 MG/1
20 TABLET ORAL DAILY
Refills: 0 | Status: DISCONTINUED | OUTPATIENT
Start: 2020-05-07 | End: 2020-05-08

## 2020-05-07 RX ORDER — ASPIRIN/CALCIUM CARB/MAGNESIUM 324 MG
81 TABLET ORAL ONCE
Refills: 0 | Status: COMPLETED | OUTPATIENT
Start: 2020-05-07 | End: 2020-05-07

## 2020-05-07 RX ADMIN — BUDESONIDE AND FORMOTEROL FUMARATE DIHYDRATE 2 PUFF(S): 160; 4.5 AEROSOL RESPIRATORY (INHALATION) at 22:23

## 2020-05-07 RX ADMIN — SIMVASTATIN 40 MILLIGRAM(S): 20 TABLET, FILM COATED ORAL at 22:22

## 2020-05-07 NOTE — H&P ADULT - NSHPPHYSICALEXAM_GEN_ALL_CORE
T(C): 36.6 (05-07-20 @ 11:41), Max: 36.6 (05-07-20 @ 11:41)  HR: 87 (05-07-20 @ 11:41) (87 - 87)  BP: 134/76 (05-07-20 @ 11:41) (134/76 - 134/76)  RR: 20 (05-07-20 @ 11:41) (20 - 20)  SpO2: 93% (05-07-20 @ 11:41) (93% - 93%)    Physical Exam:  General: Well developed, well nourished, NAD  HEENT: NC/AT, PERRLA, EOMI B/L, moist mucous membranes   Neck: Supple, nontender, no masses  CV: RRR, +S1/S2, no murmurs, rubs or gallops  Respiratory: CTA B/L, No W/R/R  Abdominal: Soft, NT, ND +BSx4  Extremities: No C/C/E, + peripheral pulses  Neurology: AAOx3, nonfocal, CN II-XII grossly intact, sensation intact T(C): 36.6 (05-07-20 @ 11:41), Max: 36.6 (05-07-20 @ 11:41)  HR: 87 (05-07-20 @ 11:41) (87 - 87)  BP: 134/76 (05-07-20 @ 11:41) (134/76 - 134/76)  RR: 20 (05-07-20 @ 11:41) (20 - 20)  SpO2: 93% (05-07-20 @ 11:41) (93% - 93%)    Physical Exam:  General: Well developed, well nourished, NAD  HEENT: NC/AT, PERRLA, EOMI B/L, moist mucous membranes   Neck: Supple, nontender, no masses  Extremities: trace pitting edema b/l LE, mild excoriation present RLE  Neurology: AAOx3, nonfocal, CN II-XII grossly intact, sensation intact

## 2020-05-07 NOTE — ED PROVIDER NOTE - OBJECTIVE STATEMENT
pt was trying to say something but didn't come out since last night.  No slurred speech.  pt is not making sense: not able to express herself in  a conversation.  pt unable to see from right side.  No weakness or numbness.   pmd Massand in [facility]

## 2020-05-07 NOTE — H&P ADULT - NSICDXPASTSURGICALHX_GEN_ALL_CORE_FT
PAST SURGICAL HISTORY:  Arthropathy left knee replacement in 2006    Bilateral cataracts with lenses in 2009    Breast cancer left  breast -- lumpectomy in 2004 -- received RT after surgery -- no chemotherapy and prev taking arimadex for 5 years

## 2020-05-07 NOTE — CONSULT NOTE ADULT - SUBJECTIVE AND OBJECTIVE BOX
[facility] - Madisyn Chambers, Jonathon Chirinos, Kyler Maroi Savella  Office Number: [phone]    Initial Consult Note    CHIEF COMPLAINT: Patient is a 85y old  Female who presents with a chief complaint of CVA (07 May 2020 15:00)      HPI:  Pt is a 85F with PMHx of HTN, HLD, COPD (not on home O2), arthritis, melanoma (s/p excision L calf) who presents after 1 day history of expressive aphasia and new R eye distortion. Pt states she lives with her daughter. States for past two days while watching TV, she could not verbalize the name of the show or channel. States this progressed to R eye vision distortion and blurriness yesterday, which prompted her to come to ED today. Denies contact with COVID confirmed persons. Denies previous CVA, or hx afib. Pt denies any HA, dizziness, CP, SOB, n/v/d.     In the ED: VS T 97.8 HR 87 /76 RR 20, 93% 2LNC. CBC shows H/H 15/47.9. .6. BMP shows CO2 32. CXR shows bibasilar opacities 2/2 atelectasis vs infiltrates. CTH shows acute/subacute infarct in L MCA distribution posteriorly.     Has not seen a cardiologist in the past.  has a history of copd, htn. She has not been taking asa  denies chest pain, difficulty breathing and palpitations.    PAST MEDICAL & SURGICAL HISTORY:  Arthritis: right knee and shoulders  Miscarriage: x 2  Melanoma  Emphysema lung: COPD  Hypercholesterolemia  Hypertension  Bilateral cataracts: with lenses in 2009  Arthropathy: left knee replacement in 2006  Breast cancer: left  breast -- lumpectomy in 2004 -- received RT after surgery -- no chemotherapy and prev taking arimadex for 5 years      SOCIAL HISTORY:  sig tobacco history (now 3-5 cigs per day), no ethanol, or drug abuse.    FAMILY HISTORY:  No pertinent family history in first degree relatives    No family history of acute MI or sudden cardiac death.    MEDICATIONS  (STANDING):  amLODIPine   Tablet 10 milliGRAM(s) Oral daily  budesonide 160 MICROgram(s)/formoterol 4.5 MICROgram(s) Inhaler 2 Puff(s) Inhalation two times a day  lisinopril 20 milliGRAM(s) Oral daily  simvastatin 40 milliGRAM(s) Oral at bedtime    MEDICATIONS  (PRN):  ALBUTerol    90 MICROgram(s) HFA Inhaler 2 Puff(s) Inhalation every 12 hours PRN Shortness of Breath and/or Wheezing      Allergies    No Known Allergies    Intolerances        REVIEW OF SYSTEMS:    CONSTITUTIONAL: reports weakness, no fevers or chills  EYES/ENT: No visual changes;  No vertigo or throat pain   NECK: No pain or stiffness  RESPIRATORY: No cough, wheezing, hemoptysis; No shortness of breath  CARDIOVASCULAR: No chest pain or palpitations  GASTROINTESTINAL: No abdominal pain. No nausea, vomiting, or hematemesis; No diarrhea or constipation. No melena or hematochezia.  GENITOURINARY: No dysuria, frequency or hematuria  NEUROLOGICAL: No numbness or weakness  SKIN: No itching or rash  All other review of systems is negative unless indicated above    VITAL SIGNS:   Vital Signs Last 24 Hrs  T(C): 36.6 (07 May 2020 11:41), Max: 36.6 (07 May 2020 11:41)  T(F): 97.8 (07 May 2020 11:41), Max: 97.8 (07 May 2020 11:41)  HR: 87 (07 May 2020 11:41) (87 - 87)  BP: 134/76 (07 May 2020 11:41) (134/76 - 134/76)  BP(mean): --  RR: 20 (07 May 2020 11:41) (20 - 20)  SpO2: 93% (07 May 2020 11:41) (93% - 93%)    I&O's Summary      On Exam:    Constitutional: NAD, alert and oriented x 3  Lungs:  Non-labored, breath sounds are clear bilaterally, No wheezing, rales or rhonchi  Cardiovascular: skipped beats, S1 and S2 positive.  No murmurs, rubs, gallops or clicks  Gastrointestinal: Bowel Sounds present, soft, nontender.   Lymph: trace peripheral edema though erythema of seven le. No cervical lymphadenopathy.  Neurological: Alert, no focal deficits  Skin: No rashes or ulcers   Psych:  Mood & affect appropriate.    LABS: All Labs Reviewed:                        15.0   7.18  )-----------( 219      ( 07 May 2020 12:10 )             47.9     07 May 2020 12:10    141    |  107    |  18     ----------------------------<  96     4.5     |  32     |  0.88     Ca    8.9        07 May 2020 12:10    TPro  7.2    /  Alb  3.2    /  TBili  0.4    /  DBili  x      /  AST  14     /  ALT  16     /  AlkPhos  94     07 May 2020 12:10          Blood Culture:         RADIOLOGY:    EKG: sr, ilbbb, nsst abn  during exam: tele with with sr with pvcs [facility] - Madisyn Chambers, Jonathon Chirinos, Kyler Mario Savella  Office Number: [phone]    Initial Consult Note    CHIEF COMPLAINT: Patient is a 85y old  Female who presents with a chief complaint of CVA (07 May 2020 15:00)      HPI:  Pt is a 85F with PMHx of HTN, HLD, COPD (not on home O2), arthritis, melanoma (s/p excision L calf) who presents after 1 day history of expressive aphasia and new R eye distortion. Pt states she lives with her daughter. States for past two days while watching TV, she could not verbalize the name of the show or channel. States this progressed to R eye vision distortion and blurriness yesterday, which prompted her to come to ED today. Denies contact with COVID confirmed persons. Denies previous CVA, or hx afib. Pt denies any HA, dizziness, CP, SOB, n/v/d.     In the ED: VS T 97.8 HR 87 /76 RR 20, 93% 2LNC. CBC shows H/H 15/47.9. .6. BMP shows CO2 32. CXR shows bibasilar opacities 2/2 atelectasis vs infiltrates. CTH shows acute/subacute infarct in L MCA distribution posteriorly.     Has not seen a cardiologist in the past.  has a history of copd, htn. She has not been taking asa  denies chest pain, difficulty breathing and palpitations.    PAST MEDICAL & SURGICAL HISTORY:  Arthritis: right knee and shoulders  Miscarriage: x 2  Melanoma  Emphysema lung: COPD  Hypercholesterolemia  Hypertension  Bilateral cataracts: with lenses in 2009  Arthropathy: left knee replacement in 2006  Breast cancer: left  breast -- lumpectomy in 2004 -- received RT after surgery -- no chemotherapy and prev taking arimadex for 5 years      SOCIAL HISTORY:  sig tobacco history (now 3-5 cigs per day), no ethanol, or drug abuse.    FAMILY HISTORY:  No pertinent family history in first degree relatives    No family history of acute MI or sudden cardiac death.    MEDICATIONS  (STANDING):  amLODIPine   Tablet 10 milliGRAM(s) Oral daily  budesonide 160 MICROgram(s)/formoterol 4.5 MICROgram(s) Inhaler 2 Puff(s) Inhalation two times a day  lisinopril 20 milliGRAM(s) Oral daily  simvastatin 40 milliGRAM(s) Oral at bedtime    MEDICATIONS  (PRN):  ALBUTerol    90 MICROgram(s) HFA Inhaler 2 Puff(s) Inhalation every 12 hours PRN Shortness of Breath and/or Wheezing      Allergies    No Known Allergies    Intolerances        REVIEW OF SYSTEMS:    CONSTITUTIONAL: reports weakness, no fevers or chills  EYES/ENT: No visual changes;  No vertigo or throat pain   NECK: No pain or stiffness  RESPIRATORY: No cough, wheezing, hemoptysis; No shortness of breath  CARDIOVASCULAR: No chest pain or palpitations  GASTROINTESTINAL: No abdominal pain. No nausea, vomiting, or hematemesis; No diarrhea or constipation. No melena or hematochezia.  GENITOURINARY: No dysuria, frequency or hematuria  NEUROLOGICAL: No numbness or weakness  SKIN: No itching or rash  All other review of systems is negative unless indicated above    VITAL SIGNS:   Vital Signs Last 24 Hrs  T(C): 36.6 (07 May 2020 11:41), Max: 36.6 (07 May 2020 11:41)  T(F): 97.8 (07 May 2020 11:41), Max: 97.8 (07 May 2020 11:41)  HR: 87 (07 May 2020 11:41) (87 - 87)  BP: 134/76 (07 May 2020 11:41) (134/76 - 134/76)  BP(mean): --  RR: 20 (07 May 2020 11:41) (20 - 20)  SpO2: 93% (07 May 2020 11:41) (93% - 93%)    I&O's Summary      On Exam:    Constitutional: NAD, alert and oriented x 3  Lungs:  Non-labored, breath sounds are decreased bilaterally, No wheezing, rales or rhonchi  Cardiovascular: skipped beats, S1 and S2 positive.  No murmurs, rubs, gallops or clicks  Gastrointestinal: Bowel Sounds present, soft, nontender.   Lymph: trace peripheral edema though erythema of seven le. No cervical lymphadenopathy.  Neurological: Alert, no focal deficits  Skin: No rashes or ulcers   Psych:  Mood & affect appropriate.    LABS: All Labs Reviewed:                        15.0   7.18  )-----------( 219      ( 07 May 2020 12:10 )             47.9     07 May 2020 12:10    141    |  107    |  18     ----------------------------<  96     4.5     |  32     |  0.88     Ca    8.9        07 May 2020 12:10    TPro  7.2    /  Alb  3.2    /  TBili  0.4    /  DBili  x      /  AST  14     /  ALT  16     /  AlkPhos  94     07 May 2020 12:10          Blood Culture:         RADIOLOGY:    EKG: sr, ilbbb, nsst abn  during exam: tele with with sr with pvcs

## 2020-05-07 NOTE — H&P ADULT - PROBLEM SELECTOR PLAN 2
hx COPD, not on home O2  mild hypercarbia noted on BMP, likely her baseline  MCV mildly elevated likely from inc erythropoiesis  CXR noted bibasilar opacities, doubt COVID19, no contacts, travel, afebrile, without elevated WBC--likely atelectasis. May give RAPHAELBs PRN hx COPD, not on home O2  mild hypercarbia noted on BMP, likely her baseline  MCV mildly elevated likely from inc erythropoiesis  CXR noted bibasilar opacities, doubt COVID19, no contacts, travel, afebrile, without elevated WBC--likely atelectasis.   c/w home symbicort and ventolin PRN

## 2020-05-07 NOTE — H&P ADULT - HISTORY OF PRESENT ILLNESS
Pt is a 85F with PMHx of HTN, HLD, COPD (not on home O2), arthritis, melanoma (s/p excision L calf) who presents after 1 day history of expressive aphasia and new R eye blindness. Pt denies any HA, dizziness, CP, SOB, n/v/d.     In the ED: VS T 97.8 HR 87 /76 RR 20, 93% 2LNC. CBC shows H/H 15/47.9. .6. BMP shows CO2 32. CXR shows bibasilar opacities 2/2 atelectasis vs infiltrates. CTH shows acute/subacute infarct in L MCA distribution posteriorly.     Pt was given 81mg ASA in the ED. Neuro consulted in ED. Pt is a 85F with PMHx of HTN, HLD, COPD (not on home O2), arthritis, melanoma (s/p excision L calf) who presents after 1 day history of expressive aphasia and new R eye distortion. Pt states she lives with her daughter. States for past two days while watching TV, she could not verbalize the name of the show or channel. States this progressed to R eye vision distortion and blurriness yesterday, which prompted her to come to ED today. Denies contact with COVID confirmed persons. Denies previous CVA, or hx afib. Pt denies any HA, dizziness, CP, SOB, n/v/d.     In the ED: VS T 97.8 HR 87 /76 RR 20, 93% 2LNC. CBC shows H/H 15/47.9. .6. BMP shows CO2 32. CXR shows bibasilar opacities 2/2 atelectasis vs infiltrates. CTH shows acute/subacute infarct in L MCA distribution posteriorly.     Pt was given 81mg ASA in the ED. Neuro consulted in ED.

## 2020-05-07 NOTE — H&P ADULT - NSICDXPASTMEDICALHX_GEN_ALL_CORE_FT
PAST MEDICAL HISTORY:  Arthritis right knee and shoulders    Emphysema lung COPD    Hypercholesterolemia     Hypertension     Melanoma     Miscarriage x 2

## 2020-05-07 NOTE — ED ADULT NURSE REASSESSMENT NOTE - NS ED NURSE REASSESS COMMENT FT1
Pt admitted for CVA- report given to Ann penaloza- pt remote tele- pt transported in stable condition to ca by  RN and EDT

## 2020-05-07 NOTE — H&P ADULT - PROBLEM SELECTOR PLAN 6
IMPROVE VTE Individual Risk Assessment          RISK                                                          Points  [  ] Previous VTE                                                3  [ x ] Thrombophilia                                             2  [  ] Lower limb paralysis                                   2        (unable to hold up >15 seconds)    [  ] Current Cancer                                             2         (within 6 months)  [  ] Immobilization > 24 hrs                              1  [  ] ICU/CCU stay > 24 hours                             1  [ x ] Age > 60                                                         1    IMPROVE VTE Score: 3, holding AC IMPROVE VTE Individual Risk Assessment          RISK                                                          Points  [  ] Previous VTE                                                3  [ x ] Thrombophilia                                             2  [  ] Lower limb paralysis                                   2        (unable to hold up >15 seconds)    [  ] Current Cancer                                             2         (within 6 months)  [  ] Immobilization > 24 hrs                              1  [  ] ICU/CCU stay > 24 hours                             1  [ x ] Age > 60                                                         1    IMPROVE VTE Score: 3, holding AC pending MRI

## 2020-05-07 NOTE — H&P ADULT - PROBLEM SELECTOR PLAN 4
chronic, stable  continue home ramipril, amlodipine w/ hold parameters below 180 systolic, to allow for permissive HTN chronic, stable  continue home ramipril, amlodipine w/ hold parameters below 190 systolic, to allow for permissive HTN

## 2020-05-07 NOTE — ED ADULT NURSE NOTE - OBJECTIVE STATEMENT
Pt BIBA for possible CVA- pt states since yesterday she has felt like she cant get the right words out -states she couldn't see the TV, could only see half of the screen, balance is off - pt has full strength to all extremities-

## 2020-05-07 NOTE — CONSULT NOTE ADULT - ASSESSMENT
Whitney is an 85 year old female with COPD and HTN, who presents with expressive aphasia, found to have a left mca infarct.  She reports no recent cardiac symptoms and has no history of af.    - no sign of acute ischemia  - no sig volume overload on exam despite possible effusions on cxr    - no history of af or symptoms to suggest this.  - ekg with sr with incomplete lbbb  - watch on telemetry for 24-48 hours  - check echocardiogram in setting of cva  - neuro follow up  - carotid dopplers  - mri pending  - depending on the result of the above, may benefit from longer term monitoring    - watch creatinine and electrolytes. Keep K>4, Mg>2  - will follow with you Whitney is an 85 year old female with COPD and HTN, who presents with expressive aphasia, found to have a left mca infarct.  She reports no recent cardiac symptoms and has no history of af.    - no sign of acute ischemia  - no sig volume overload on exam despite possible effusions on cxr    - no history of af or symptoms to suggest this.  - ekg with sr with incomplete lbbb  - watch on telemetry for 24-48 hours  - check echocardiogram in setting of cva  - check lipid panel. Cont statin  - asa    - neuro follow up  - carotid dopplers  - mri pending  - depending on the result of the above, may benefit from longer term monitoring    - history of HTN  - BP acceptable, though may need to hold antihypertensives in short term    - watch creatinine and electrolytes. Keep K>4, Mg>2  - will follow with you

## 2020-05-07 NOTE — H&P ADULT - NSHPREVIEWOFSYSTEMS_GEN_ALL_CORE
Constitutional: denies fever, chills, sweating  HEENT: denies headache, dizziness, or lightheadedness  Respiratory: denies SOB, cough, or wheezing  Cardiovascular: denies CP, palpitations  Gastrointestinal: denies nausea, vomiting, diarrhea, constipation, abdominal pain, or bloody stools  Genitourinary: denies painful urination, increased frequency, urgency, or bloody urine  Skin/Breast: denies rashes or itching  Musculoskeletal: denies muscle aches, joint swelling, or muscle weakness  Neurologic: denies loss of sensation, numbness, or tingling  ROS negative except as noted above Constitutional: denies fever, chills, sweating  HEENT: denies headache, dizziness, or lightheadedness, (+) R eye blurriness  Respiratory: denies SOB, cough, or wheezing  Cardiovascular: denies CP, palpitations  Gastrointestinal: denies nausea, vomiting, diarrhea, constipation, abdominal pain, or bloody stools  Genitourinary: denies painful urination, increased frequency, urgency, or bloody urine  Skin/Breast: denies rashes or itching  Musculoskeletal: denies muscle aches, joint swelling, or muscle weakness  Neurologic: denies loss of sensation, numbness, or tingling, (+) difficulty expressing thoughts  ROS negative except as noted above

## 2020-05-07 NOTE — CONSULT NOTE ADULT - SUBJECTIVE AND OBJECTIVE BOX
exp aphasia rt hemiopsia   rule out cva vs ca  ct head  will need mri possible with contrast  if no bleed asa 325 and zocor 40   pls call me with results

## 2020-05-07 NOTE — H&P ADULT - PROBLEM SELECTOR PLAN 1
Admit to telemetry  CT head shows L MCA infarct--likely causing R eye blindness and expressive aphasia, however no speech slurring noted  Bedside dysphagia screen pending - NPO pending S+S eval  MRI brain, MRA brain, TTE, Carotid sono  aspiration precautions  neuro consulted Dr. Carranza, recs noted  Neuro checks Q4  EKG shows mild ST elevations from V1-V4, however looks similar to prior, doubt ACS  cardio consult Dr. Bey  A1c, lipid panel for AM  check TSH, B12   antiplatelet ordered  PT/OT eval  TTE  40mg atorvastatin ordered  anticoagulant ordered (will reassess further need pending clinical course)  CVA nursing protocols order set initiated. Admit to telemetry  CT head shows L MCA infarct--likely causing R eye blindness and expressive aphasia, however no speech slurring noted.  Bedside dysphagia screen passed- diet as tolerated. formal S+S eval pending  MRI brain, MRA brain, TTE, Carotid sono  aspiration precautions  neuro consulted Dr. Carranza, recs noted  Neuro checks Q4  EKG shows mild ST elevations from V1-V4, however looks similar to prior, doubt ACS  cardio consulted Dr. Bey  A1c, lipid panel for AM  check TSH, B12    qd  PT/OT eval  TTE, carotid dopplers  40mg atorvastatin ordered qd  anticoagulant ordered (will reassess further need pending clinical course)  CVA nursing protocols order set initiated. Admit to telemetry  CT head shows L MCA infarct--likely causing R eye blindness and expressive aphasia, however no speech slurring noted.  Bedside dysphagia screen passed- diet as tolerated. formal S+S eval pending  MRI brain, MRA brain, TTE, Carotid sono  aspiration precautions  neuro consulted Dr. Carranza, recs noted  Neuro checks Q4  EKG shows mild ST elevations from V1-V4, however looks similar to prior, doubt ACS  cardio consulted Dr. Bey  A1c, lipid panel for AM  check TSH, B12    qd  PT/OT eval  40mg atorvastatin qd  anticoagulant ordered (will reassess further need pending clinical course)  CVA nursing protocols order set initiated.

## 2020-05-07 NOTE — H&P ADULT - ASSESSMENT
Pt is a 85F with PMHx of HTN, HLD, COPD (not on home O2), arthritis, melanoma (s/p excision L calf) who presents after 1 day history of expressive aphasia and new R eye blindness admitted for acute vs subacute L MCA infarct. 50

## 2020-05-08 DIAGNOSIS — R94.31 ABNORMAL ELECTROCARDIOGRAM [ECG] [EKG]: ICD-10-CM

## 2020-05-08 LAB
ANION GAP SERPL CALC-SCNC: 4 MMOL/L — LOW (ref 5–17)
BASOPHILS # BLD AUTO: 0.07 K/UL — SIGNIFICANT CHANGE UP (ref 0–0.2)
BASOPHILS NFR BLD AUTO: 1.2 % — SIGNIFICANT CHANGE UP (ref 0–2)
BUN SERPL-MCNC: 19 MG/DL — SIGNIFICANT CHANGE UP (ref 7–23)
CALCIUM SERPL-MCNC: 8.4 MG/DL — LOW (ref 8.5–10.1)
CHLORIDE SERPL-SCNC: 108 MMOL/L — SIGNIFICANT CHANGE UP (ref 96–108)
CHOLEST SERPL-MCNC: 117 MG/DL — SIGNIFICANT CHANGE UP (ref 10–199)
CO2 SERPL-SCNC: 34 MMOL/L — HIGH (ref 22–31)
CREAT SERPL-MCNC: 0.82 MG/DL — SIGNIFICANT CHANGE UP (ref 0.5–1.3)
EOSINOPHIL # BLD AUTO: 0.11 K/UL — SIGNIFICANT CHANGE UP (ref 0–0.5)
EOSINOPHIL NFR BLD AUTO: 1.8 % — SIGNIFICANT CHANGE UP (ref 0–6)
GLUCOSE SERPL-MCNC: 83 MG/DL — SIGNIFICANT CHANGE UP (ref 70–99)
HCT VFR BLD CALC: 44.5 % — SIGNIFICANT CHANGE UP (ref 34.5–45)
HDLC SERPL-MCNC: 37 MG/DL — LOW
HGB BLD-MCNC: 13.5 G/DL — SIGNIFICANT CHANGE UP (ref 11.5–15.5)
IMM GRANULOCYTES NFR BLD AUTO: 0.2 % — SIGNIFICANT CHANGE UP (ref 0–1.5)
LIPID PNL WITH DIRECT LDL SERPL: 64 MG/DL — SIGNIFICANT CHANGE UP
LYMPHOCYTES # BLD AUTO: 1.53 K/UL — SIGNIFICANT CHANGE UP (ref 1–3.3)
LYMPHOCYTES # BLD AUTO: 25.4 % — SIGNIFICANT CHANGE UP (ref 13–44)
MCHC RBC-ENTMCNC: 30.3 GM/DL — LOW (ref 32–36)
MCHC RBC-ENTMCNC: 31.2 PG — SIGNIFICANT CHANGE UP (ref 27–34)
MCV RBC AUTO: 102.8 FL — HIGH (ref 80–100)
MONOCYTES # BLD AUTO: 0.44 K/UL — SIGNIFICANT CHANGE UP (ref 0–0.9)
MONOCYTES NFR BLD AUTO: 7.3 % — SIGNIFICANT CHANGE UP (ref 2–14)
NEUTROPHILS # BLD AUTO: 3.87 K/UL — SIGNIFICANT CHANGE UP (ref 1.8–7.4)
NEUTROPHILS NFR BLD AUTO: 64.1 % — SIGNIFICANT CHANGE UP (ref 43–77)
NRBC # BLD: 0 /100 WBCS — SIGNIFICANT CHANGE UP (ref 0–0)
PLATELET # BLD AUTO: 198 K/UL — SIGNIFICANT CHANGE UP (ref 150–400)
POTASSIUM SERPL-MCNC: 5 MMOL/L — SIGNIFICANT CHANGE UP (ref 3.5–5.3)
POTASSIUM SERPL-SCNC: 5 MMOL/L — SIGNIFICANT CHANGE UP (ref 3.5–5.3)
RBC # BLD: 4.33 M/UL — SIGNIFICANT CHANGE UP (ref 3.8–5.2)
RBC # FLD: 15.1 % — HIGH (ref 10.3–14.5)
SODIUM SERPL-SCNC: 146 MMOL/L — HIGH (ref 135–145)
TOTAL CHOLESTEROL/HDL RATIO MEASUREMENT: 3.2 RATIO — LOW (ref 3.3–7.1)
TRIGL SERPL-MCNC: 78 MG/DL — SIGNIFICANT CHANGE UP (ref 10–149)
TSH SERPL-MCNC: 2.27 UIU/ML — SIGNIFICANT CHANGE UP (ref 0.36–3.74)
WBC # BLD: 6.03 K/UL — SIGNIFICANT CHANGE UP (ref 3.8–10.5)
WBC # FLD AUTO: 6.03 K/UL — SIGNIFICANT CHANGE UP (ref 3.8–10.5)

## 2020-05-08 PROCEDURE — 99233 SBSQ HOSP IP/OBS HIGH 50: CPT | Mod: GC

## 2020-05-08 PROCEDURE — 99232 SBSQ HOSP IP/OBS MODERATE 35: CPT

## 2020-05-08 RX ADMIN — BUDESONIDE AND FORMOTEROL FUMARATE DIHYDRATE 2 PUFF(S): 160; 4.5 AEROSOL RESPIRATORY (INHALATION) at 22:09

## 2020-05-08 RX ADMIN — SIMVASTATIN 40 MILLIGRAM(S): 20 TABLET, FILM COATED ORAL at 22:08

## 2020-05-08 RX ADMIN — Medication 325 MILLIGRAM(S): at 11:23

## 2020-05-08 NOTE — PROGRESS NOTE ADULT - ASSESSMENT
Pt is a 85F with PMHx of HTN, HLD, COPD (not on home O2), arthritis, melanoma (s/p excision L calf) who presents after 1 day history of expressive aphasia and new R eye blindness admitted for acute vs subacute L MCA infarct.

## 2020-05-08 NOTE — PHYSICAL THERAPY INITIAL EVALUATION ADULT - PERTINENT HX OF CURRENT PROBLEM, REHAB EVAL
t is a 85F with PMHx of HTN, HLD, COPD (not on home O2), arthritis, melanoma (s/p excision L calf) who presents after 1 day history of expressive aphasia and new R eye distortion. Pt states she lives with her daughter.

## 2020-05-08 NOTE — PROGRESS NOTE ADULT - ATTENDING COMMENTS
Chart reviewed    Patient seen and examined    Agree with plan as outlined above
Pt is a 85F with PMHx of HTN, HLD, COPD (not on home O2), arthritis, melanoma (s/p excision L calf) who presents after 1 day history of expressive aphasia and new R eye blindness admitted for acute vs subacute L MCA infarct.        : CVA (cerebral vascular accident).  Plan: CT head shows L MCA infarct--likely causing R hemianopsia and expressive aphasia, however no dysarthria   Bedside dysphagia screen passed- diet as tolerated. formal S+S eval pending  MRI BRAIN: Evolving acute infarct in the left posterior parietal lobe (distal left MCA territory distribution). No evidence of hemorrhagic transformation.  MRA HEAD: No evidence of vascular stenosis, occlusion, aneurysm or vascular malformation.  TTE Carotid sono pending    qd, 40mg atorvastatin qd  A1c, lipid panel, TSH, B12 pending   neuro Dr. Carranza, recs noted  d/c planning

## 2020-05-08 NOTE — PROGRESS NOTE ADULT - ASSESSMENT
85 year old female with COPD and HTN, who presents with expressive aphasia, found to have a left mca infarct.  She reports no recent cardiac symptoms and has no history of af.    Acute CVA  - No history of af or symptoms to suggest this.  - EKG showed NSR with incomplete LBBB  - No evidence of occult Afib on tele.  Continue to monitor for now  - Follow up echocardiogram in setting of cva  - Neuro following  - Continue ASA and statin  - No sign of acute ischemia  - No sig volume overload on exam despite possible effusions on cxr  - Follow up carotid dopplers  - MRI/A head noted  - She will need a loop recorder     HTN  - Would allow permissive HTN for now.  SBP's ranging 100's-130  - Will hold anti-HTN meds for now    DVT pp  - Per Primary    - Monitor creatinine and electrolytes. Keep K>4, Mg>2    All other w/u per Primary and Neuro  Will follow with you    Cheryl Leal DNP, NP-C  Cardiology   Spectra #[phone]

## 2020-05-08 NOTE — PROGRESS NOTE ADULT - PROBLEM SELECTOR PLAN 1
CT head shows L MCA infarct--likely causing R hemianopsia and expressive aphasia, however no dysarthria   Bedside dysphagia screen passed- diet as tolerated. formal S+S eval pending  MRI BRAIN: Evolving acute infarct in the left posterior parietal lobe (distal left MCA territory distribution). No evidence of hemorrhagic transformation.  MRA HEAD: No evidence of vascular stenosis, occlusion, aneurysm or vascular malformation.  TTE Carotid sono pending    qd, 40mg atorvastatin qd  Neuro checks Q4, aspiration precautions   A1c, lipid panel, TSH, B12 pending   neuro Dr. Carranza, recs noted  PT/OT eval pending

## 2020-05-08 NOTE — PROGRESS NOTE ADULT - PROBLEM SELECTOR PLAN 7
IMPROVE VTE Individual Risk Assessment          RISK                                                          Points  [  ] Previous VTE                                                3  [ x ] Thrombophilia                                             2  [  ] Lower limb paralysis                                   2        (unable to hold up >15 seconds)    [  ] Current Cancer                                             2         (within 6 months)  [  ] Immobilization > 24 hrs                              1  [  ] ICU/CCU stay > 24 hours                             1  [ x ] Age > 60                                                         1    IMPROVE VTE Score: 3, holding AC pending MRI

## 2020-05-08 NOTE — PROGRESS NOTE ADULT - SUBJECTIVE AND OBJECTIVE BOX
This progress note was completed in part by resident acting as telephonic scribe during COVID-19 crisis. Physical exam and review of systems was completed by attending physician, and findings below are those of the attending physician, and have been reviewed in detail.    Patient is a 85y old  Female who presents with a chief complaint of CVA (08 May 2020 09:16)      INTERVAL HPI/OVERNIGHT EVENTS: Pt seen and examined at bedside. No acute events overnight.     T(C): 36.7 (20 @ 04:55), Max: 37 (20 @ 20:46)  HR: 79 (20 @ 04:55) (60 - 89)  BP: 109/67 (20 @ 04:55) (109/67 - 144/71)  RR: 18 (20 @ 04:55) (16 - 20)  SpO2: 92% (20 @ 04:55) (92% - 95%)  Wt(kg): --    I&O's Summary      LABS:                        13.5   6.03  )-----------( 198      ( 08 May 2020 09:09 )             44.5     05-08    146<H>  |  108  |  19  ----------------------------<  83  5.0   |  34<H>  |  0.82    Ca    8.4<L>      08 May 2020 09:09    TPro  7.2  /  Alb  3.2<L>  /  TBili  0.4  /  DBili  x   /  AST  14<L>  /  ALT  16  /  AlkPhos  94  05-07      CAPILLARY BLOOD GLUCOSE          Urinalysis Basic - ( 07 May 2020 13:18 )    Color: Yellow / Appearance: Slightly Turbid / S.015 / pH: x  Gluc: x / Ketone: Negative  / Bili: Negative / Urobili: Negative   Blood: x / Protein: 30 mg/dL / Nitrite: Negative   Leuk Esterase: Negative / RBC: 0-2 /HPF / WBC 0-2   Sq Epi: x / Non Sq Epi: Few / Bacteria: Occasional           MEDICATIONS  (STANDING):  amLODIPine   Tablet 10 milliGRAM(s) Oral daily  aspirin 325 milliGRAM(s) Oral daily  budesonide 160 MICROgram(s)/formoterol 4.5 MICROgram(s) Inhaler 2 Puff(s) Inhalation two times a day  lisinopril 20 milliGRAM(s) Oral daily  simvastatin 40 milliGRAM(s) Oral at bedtime    MEDICATIONS  (PRN):  ALBUTerol    90 MICROgram(s) HFA Inhaler 2 Puff(s) Inhalation every 12 hours PRN Shortness of Breath and/or Wheezing      REVIEW OF SYSTEMS:    Constitutional:  denies fever, chills  Head:  denies headaches  Eyes:  admits loss of vision on the right eye    Ears:  denies tinnitus     Neck:  denies neck pain.   Respiratory:  denies SOB     Cardiovascular:  denies CP     Abdomen: denies nausea, vomiting, or abdominal pain.    Extremities/Neurological:  denies numbness or tingling.    Musculoskeletal:  admits to chronic joint pain     PHYSICAL EXAMINATION:     General: awake, alert   Head:  Normocephalic and atraumatic.    Eyes:  No scleral icterus.    Ears:  Hearing bilaterally intact.    NECK:  Supple.    RESPIRATORY:  CTA b/l     CARDIOVASCULAR:  S1 and S2 noted     ABDOMEN:  Soft no TTP .   EXTREMITIES:  No clubbing, no cyanosis     NEUROLOGIC: stated incorrect age, was able to say the correct month, EOMI. right hemianopsia. difficulty with expression,   no arm drift, b/l UE motor strength 4+/5 and b/l LE 3/5, sensation intact b/l UE and LE  SKIN: Warm, dry, well-perfused    RADIOLOGY & ADDITIONAL TESTS:    Imaging Personally Reviewed:  [x ] YES  [ ] NO    Consultant(s) Notes Reviewed:  [x ] YES  [ ] NO      Care Discussed with Consultants/Other Providers [x ] YES  [ ] NO

## 2020-05-08 NOTE — PROGRESS NOTE ADULT - SUBJECTIVE AND OBJECTIVE BOX
[facility] -- Madisyn Chambers Grossman, [place], Kyler Mario Savella, Goodger  Office # [phone]    Follow Up:  Acute CVA    Subjective/Observations: Sitting on the chair, no new neuro symptoms.  On NC, denies SOB or ADORNO at present.  Denies any chest discomfort.  c/o of vision loss since the stroke    REVIEW OF SYSTEMS: All other review of systems is negative unless indicated above  PAST MEDICAL & SURGICAL HISTORY:  Arthritis: right knee and shoulders  Miscarriage: x 2  Melanoma  Emphysema lung: COPD  Hypercholesterolemia  Hypertension  Bilateral cataracts: with lenses in 2009  Arthropathy: left knee replacement in 2006  Breast cancer: left  breast -- lumpectomy in 2004 -- received RT after surgery -- no chemotherapy and prev taking arimadex for 5 years    MEDICATIONS  (STANDING):  amLODIPine   Tablet 10 milliGRAM(s) Oral daily  aspirin 325 milliGRAM(s) Oral daily  budesonide 160 MICROgram(s)/formoterol 4.5 MICROgram(s) Inhaler 2 Puff(s) Inhalation two times a day  lisinopril 20 milliGRAM(s) Oral daily  simvastatin 40 milliGRAM(s) Oral at bedtime    MEDICATIONS  (PRN):  ALBUTerol    90 MICROgram(s) HFA Inhaler 2 Puff(s) Inhalation every 12 hours PRN Shortness of Breath and/or Wheezing    Allergies    No Known Allergies    Intolerances    Vital Signs Last 24 Hrs  T(C): 36.7 (08 May 2020 04:55), Max: 37 (07 May 2020 20:46)  T(F): 98.1 (08 May 2020 04:55), Max: 98.6 (07 May 2020 20:46)  HR: 79 (08 May 2020 04:55) (60 - 89)  BP: 109/67 (08 May 2020 04:55) (109/67 - 144/71)  BP(mean): --  RR: 18 (08 May 2020 04:55) (16 - 18)  SpO2: 92% (08 May 2020 04:55) (92% - 95%)  I&O's Summary      PHYSICAL EXAM:  TELE: NSR  Constitutional: NAD, awake and alert, obese  HEENT: Moist Mucous Membranes, Anicteric  Pulmonary: Non-labored, breath sounds are diminished bilaterally, No wheezing, rales or rhonchi  Cardiovascular: Regular, S1 and S2, No murmurs, rubs, gallops or clicks  Gastrointestinal: Bowel Sounds present, soft, nontender.   Lymph: Trace  peripheral edema. No lymphadenopathy.  Skin: No visible rashes or ulcers.  Psych:  Mood & affect appropriate  LABS: All Labs Reviewed:                        13.5   6.03  )-----------( 198      ( 08 May 2020 09:09 )             44.5                         15.0   7.18  )-----------( 219      ( 07 May 2020 12:10 )             47.9     08 May 2020 09:09    146    |  108    |  19     ----------------------------<  83     5.0     |  34     |  0.82   07 May 2020 12:10    141    |  107    |  18     ----------------------------<  96     4.5     |  32     |  0.88     Ca    8.4        08 May 2020 09:09  Ca    8.9        07 May 2020 12:10    TPro  7.2    /  Alb  3.2    /  TBili  0.4    /  DBili  x      /  AST  14     /  ALT  16     /  AlkPhos  94     07 May 2020 12:10    < from: MR Head No Cont (05.07.20 @ 19:27) >    EXAM:  MR ANGIO BRAIN                          EXAM:  MR BRAIN                            PROCEDURE DATE:  05/07/2020          INTERPRETATION:  CLINICAL INDICATION: Altered mental status. Left MCA infarct.    TECHNIQUE: Multi-planar multi-sequential MR imaging of the brain was performed without intravenous contrast. MRA of the head using time-of-flight technique was performed without intravenous contrast.     COMPARISON: CT head 5/7/2020.    FINDINGS:    MRI BRAIN:    There is restricted diffusion with corresponding signal dropout on ADC maps and T2/FLAIR hyperintense cytotoxic edema in the left posterior parietal lobe, findings consistent with evolving acute infarct in the distal left MCA vascular territory distribution. There is no evidence of associated hemorrhagic transformation. There is localized mass effect on the surrounding brain parenchyma resulting in cortical sulcal effacement.     There are scattered T2/FLAIR hyperintense foci in the periventricular and subcortical white matter which are nonspecific finding, but most likely represent sequelae of mild to moderate chronic microvascular ischemic disease. There are punctate chronic lacunar infarcts in the right thalamus and right paramedian michelle. There is a chronic lacunar infarct in the left lateral cerebellar hemisphere (series 7, image 3). There is cortical sulcal prominence related to brain parenchymal volume loss.    There is no intracranial mass or obstructive hydrocephalus. There are no extra-axial fluid collections.  There is an enlarged, partially empty sella turcica.    The patient is status post bilateral lens replacement. There are mild mucosal inflammatory changes of the ethmoid air cells. The mastoid air cells are grossly clear. The visualized soft tissues and osseous structures appear unremarkable.    MRA HEAD:    Evaluation of the anterior circulation demonstrates normal distal internal carotid arteries.  The proximal anterior, middle and posterior cerebral arteries are patent bilaterally. Please note, there is a dominant right A2 segment, likely developmental variant.    Evaluation of the posterior circulation demonstrates patent vertebrobasilar system. Please note, there is a hypoplastic right P1 segment with a prominent right posterior communicating artery, findings compatible with fetal origin of the right PCA, developmental variant.    No evidence of vascular stenosis, occlusion, aneurysm or vascular malformation in the [place].     IMPRESSION:     MRI BRAIN: Evolving acute infarct in the left posterior parietal lobe (distal left MCA territory distribution). No evidence of hemorrhagic transformation.    MRA HEAD: No evidence of vascular stenosis, occlusion, aneurysm or vascular malformation.    VENANCIO URBAN M.D., ATTENDING RADIOLOGIST  This document has been electronically signed. May  7 2020  7:55PM     < end of copied text >    < from: Xray Chest 1 View AP/PA (05.07.20 @ 12:30) >    EXAM:  XR CHEST AP OR PA 1V                            PROCEDURE DATE:  05/07/2020          INTERPRETATION:  CLINICAL INDICATION: 85 years  Female with adm.    COMPARISON: 9/3/2019    The patient's chin obscures the superior mediastinum and lung apices.    AP view of the chest demonstrates bibasilar opacities and small bilateral pleural effusions.. There is no pleural effusion. There is no pneumothorax.    The heart is normal in size. The mediastinum and tamara cannot be assessed due to projection.     The pulmonary vasculature is normal.     Mild thoracic degenerative changes are present.    IMPRESSION:    Bibasilar opacities secondary to atelectasis or infiltrate. Small bilateral pleural effusions.    DISCRETE X-RAY DATA:  Percent of LEFT lung opacification: 1-33%  Percent of RIGHT lung opacification: 1-33%  Change in lung opacification from most recent x-ray (<=3 days): No Prior  Change from prior dated 3 or more days (same admission): No Prior    SEBASTIAN KONG M.D., ATTENDING RADIOLOGIST  This document has been electronically signed. May  7 2020 12:32PM      < end of copied text >    < from: 12 Lead ECG (10.04.19 @ 15:14) >    Ventricular Rate 81 BPM    Atrial Rate 81 BPM    P-R Interval 148 ms    QRS Duration 106 ms    Q-T Interval 382 ms    QTC Calculation(Bezet) 443 ms    P Axis 60 degrees    R Axis -21 degrees    T Axis 99 degrees    Diagnosis Line Normal sinus rhythm  Left ventricular hypertrophy with repolarization abnormality  Abnormal ECG    < end of copied text >

## 2020-05-08 NOTE — PROGRESS NOTE ADULT - PROBLEM SELECTOR PLAN 3
hx COPD, not on home O2  mild hypercarbia noted on BMP, likely her baseline  MCV mildly elevated likely from inc erythropoiesis  CXR noted bibasilar opacities, afebrile, no leukocytosis, likely atelectasis   -negative 5/7/20 for COVID19  c/w home symbicort and ventolin PRN

## 2020-05-08 NOTE — PROGRESS NOTE ADULT - SUBJECTIVE AND OBJECTIVE BOX
Neurology follow up note    KATERINA [id number]      Interval History:    Patient feels ok no new complaints.    MEDICATIONS    ALBUTerol    90 MICROgram(s) HFA Inhaler 2 Puff(s) Inhalation every 12 hours PRN  amLODIPine   Tablet 10 milliGRAM(s) Oral daily  aspirin 325 milliGRAM(s) Oral daily  budesonide 160 MICROgram(s)/formoterol 4.5 MICROgram(s) Inhaler 2 Puff(s) Inhalation two times a day  lisinopril 20 milliGRAM(s) Oral daily  simvastatin 40 milliGRAM(s) Oral at bedtime      Allergies    No Known Allergies    Intolerances          Weight (kg): 68 (05-07 @ 11:41)    Vital Signs Last 24 Hrs  T(C): 36.7 (08 May 2020 04:55), Max: 37 (07 May 2020 20:46)  T(F): 98.1 (08 May 2020 04:55), Max: 98.6 (07 May 2020 20:46)  HR: 79 (08 May 2020 04:55) (60 - 89)  BP: 109/67 (08 May 2020 04:55) (109/67 - 144/71)  BP(mean): --  RR: 18 (08 May 2020 04:55) (16 - 20)  SpO2: 92% (08 May 2020 04:55) (92% - 95%)    REVIEW OF SYSTEMS:  Constitutional:  The patient denies fever, chills, or night sweats.  Head:  No headaches.  Eyes:  Positive loss of vision on the right eye.  Ears:  No ringing in the ears.  Neck:  No neck pain.  Respiratory:  No shortness of breath.  Cardiovascular:  No chest pain.  Abdomen:  No nausea, vomiting, or abdominal pain.  Extremities/Neurological:  No numbness or tingling.  Musculoskeletal:  Positive history of joint pain from arthritis.    PHYSICAL EXAMINATION:   HEENT:  Head:  Normocephalic and atraumatic.  Eyes:  No scleral icterus.  Ears:  Hearing bilaterally was intact.  NECK:  Supple.  RESPIRATORY:  Good air entry bilaterally.  CARDIOVASCULAR:  S1 and S2 are heard.  ABDOMEN:  Soft and nontender.  EXTREMITIES:  No clubbing or cyanosis were noted.      NEUROLOGIC:  The patient is awake and alert.  Said the incorrect age, was able to say the correct month, was able to name simple objects.  Extraocular movements were intact.  The patient has a right-sided field cut, a right hemianopsia.  Speech:  No dysarthria was noted, but the patient does have problems expressing herself at times saying the correct things.  Motor:  Bilateral uppers 4+/5 and bilateral lowers 3-/5, but has arthritis in both legs.  Sensory:  Bilateral uppers and lowers intact to light touch.  No drift.              LABS:  CBC Full  -  ( 08 May 2020 09:09 )  WBC Count : 6.03 K/uL  RBC Count : 4.33 M/uL  Hemoglobin : 13.5 g/dL  Hematocrit : 44.5 %  Platelet Count - Automated : 198 K/uL  Mean Cell Volume : 102.8 fl  Mean Cell Hemoglobin : 31.2 pg  Mean Cell Hemoglobin Concentration : 30.3 gm/dL  Auto Neutrophil # : 3.87 K/uL  Auto Lymphocyte # : 1.53 K/uL  Auto Monocyte # : 0.44 K/uL  Auto Eosinophil # : 0.11 K/uL  Auto Basophil # : 0.07 K/uL  Auto Neutrophil % : 64.1 %  Auto Lymphocyte % : 25.4 %  Auto Monocyte % : 7.3 %  Auto Eosinophil % : 1.8 %  Auto Basophil % : 1.2 %    Urinalysis Basic - ( 07 May 2020 13:18 )    Color: Yellow / Appearance: Slightly Turbid / S.015 / pH: x  Gluc: x / Ketone: Negative  / Bili: Negative / Urobili: Negative   Blood: x / Protein: 30 mg/dL / Nitrite: Negative   Leuk Esterase: Negative / RBC: 0-2 /HPF / WBC 0-2   Sq Epi: x / Non Sq Epi: Few / Bacteria: Occasional          141  |  107  |  18  ----------------------------<  96  4.5   |  32<H>  |  0.88    Ca    8.9      07 May 2020 12:10    TPro  7.2  /  Alb  3.2<L>  /  TBili  0.4  /  DBili  x   /  AST  14<L>  /  ALT  16  /  AlkPhos  94  05-07    Hemoglobin A1C:     LIVER FUNCTIONS - ( 07 May 2020 12:10 )  Alb: 3.2 g/dL / Pro: 7.2 g/dL / ALK PHOS: 94 U/L / ALT: 16 U/L / AST: 14 U/L / GGT: x           Vitamin B12         RADIOLOGY      ANALYSIS AND PLAN:  This an 85-year-old with hemianopsia, expressive aphasia.  1.	For hemianopsia and expressive aphasia, cerebrovascular accident    The patient's NIH stroke scale would be 3.  2.	I will plan for CT imaging of the brain.  Based on that, the patient will need additional test with MRI possibly with and without contrast.  3.	MRI positive for CVA  4.	aspirin 325 once a day for 2 weeks than 81mg  5.	We will continue the patient on her cholesterol medication.  6.	Monitor respiratory status for history of chronic obstructive pulmonary disease.  7.	The patient will need echocardiogram.  8.	follow carotid doppler   9.	spoke to spouse today updated him  10.	Greater than 45 minutes of time was spent with the patient, plan of care, reviewing data, speaking to multidisciplinary healthcare team.

## 2020-05-08 NOTE — PROGRESS NOTE ADULT - PROBLEM SELECTOR PLAN 2
EKG shows mild ST elevations from V1-V4, however looks similar to prior, doubt ACS  cardio consulted Dr. Bey: f/u reccs

## 2020-05-08 NOTE — PHYSICAL THERAPY INITIAL EVALUATION ADULT - GAIT TRAINING, PT EVAL
Patient will ambulate x 100 feet with RW independently in 1 week in order to increase mobility at home

## 2020-05-08 NOTE — PHYSICAL THERAPY INITIAL EVALUATION ADULT - ADDITIONAL COMMENTS
Patient lives with  and daughter in private home, + ESTELLE. Patient resides on second floor.  Patient was independent in all ADLs and ambulates independently without device, patient has access to RW

## 2020-05-08 NOTE — STROKE CODE NOTE - NIH STROKE SCALE: 1A. LEVEL OF CONSCIOUSNESS, QM
Low salt diet/Activities as tolerated/Call primary care provider for follow up after discharge/Monitor weight daily/Report signs and symptoms to primary care provider (0) Alert; keenly responsive

## 2020-05-08 NOTE — PROGRESS NOTE ADULT - PROBLEM SELECTOR PLAN 5
chronic, stable  continue home ramipril, amlodipine w/ hold parameters below 190 systolic, to allow for permissive HTN

## 2020-05-09 ENCOUNTER — TRANSCRIPTION ENCOUNTER (OUTPATIENT)
Age: 85
End: 2020-05-09

## 2020-05-09 VITALS — OXYGEN SATURATION: 93 %

## 2020-05-09 LAB
ANION GAP SERPL CALC-SCNC: 3 MMOL/L — LOW (ref 5–17)
BUN SERPL-MCNC: 18 MG/DL — SIGNIFICANT CHANGE UP (ref 7–23)
CALCIUM SERPL-MCNC: 8.8 MG/DL — SIGNIFICANT CHANGE UP (ref 8.5–10.1)
CHLORIDE SERPL-SCNC: 107 MMOL/L — SIGNIFICANT CHANGE UP (ref 96–108)
CO2 SERPL-SCNC: 37 MMOL/L — HIGH (ref 22–31)
CREAT SERPL-MCNC: 0.84 MG/DL — SIGNIFICANT CHANGE UP (ref 0.5–1.3)
GLUCOSE SERPL-MCNC: 88 MG/DL — SIGNIFICANT CHANGE UP (ref 70–99)
HCT VFR BLD CALC: 45 % — SIGNIFICANT CHANGE UP (ref 34.5–45)
HGB BLD-MCNC: 13.5 G/DL — SIGNIFICANT CHANGE UP (ref 11.5–15.5)
MCHC RBC-ENTMCNC: 30 GM/DL — LOW (ref 32–36)
MCHC RBC-ENTMCNC: 31.3 PG — SIGNIFICANT CHANGE UP (ref 27–34)
MCV RBC AUTO: 104.2 FL — HIGH (ref 80–100)
NRBC # BLD: 0 /100 WBCS — SIGNIFICANT CHANGE UP (ref 0–0)
PLATELET # BLD AUTO: 201 K/UL — SIGNIFICANT CHANGE UP (ref 150–400)
POTASSIUM SERPL-MCNC: 4.9 MMOL/L — SIGNIFICANT CHANGE UP (ref 3.5–5.3)
POTASSIUM SERPL-SCNC: 4.9 MMOL/L — SIGNIFICANT CHANGE UP (ref 3.5–5.3)
RBC # BLD: 4.32 M/UL — SIGNIFICANT CHANGE UP (ref 3.8–5.2)
RBC # FLD: 14.8 % — HIGH (ref 10.3–14.5)
SODIUM SERPL-SCNC: 147 MMOL/L — HIGH (ref 135–145)
WBC # BLD: 6.34 K/UL — SIGNIFICANT CHANGE UP (ref 3.8–10.5)
WBC # FLD AUTO: 6.34 K/UL — SIGNIFICANT CHANGE UP (ref 3.8–10.5)

## 2020-05-09 PROCEDURE — 80048 BASIC METABOLIC PNL TOTAL CA: CPT

## 2020-05-09 PROCEDURE — 71045 X-RAY EXAM CHEST 1 VIEW: CPT

## 2020-05-09 PROCEDURE — 99233 SBSQ HOSP IP/OBS HIGH 50: CPT | Mod: GC

## 2020-05-09 PROCEDURE — 99232 SBSQ HOSP IP/OBS MODERATE 35: CPT

## 2020-05-09 PROCEDURE — 93005 ELECTROCARDIOGRAM TRACING: CPT

## 2020-05-09 PROCEDURE — 70450 CT HEAD/BRAIN W/O DYE: CPT

## 2020-05-09 PROCEDURE — 93880 EXTRACRANIAL BILAT STUDY: CPT | Mod: 26

## 2020-05-09 PROCEDURE — 84443 ASSAY THYROID STIM HORMONE: CPT

## 2020-05-09 PROCEDURE — 87635 SARS-COV-2 COVID-19 AMP PRB: CPT

## 2020-05-09 PROCEDURE — 80053 COMPREHEN METABOLIC PANEL: CPT

## 2020-05-09 PROCEDURE — 36415 COLL VENOUS BLD VENIPUNCTURE: CPT

## 2020-05-09 PROCEDURE — 99285 EMERGENCY DEPT VISIT HI MDM: CPT

## 2020-05-09 PROCEDURE — 93880 EXTRACRANIAL BILAT STUDY: CPT

## 2020-05-09 PROCEDURE — 97162 PT EVAL MOD COMPLEX 30 MIN: CPT

## 2020-05-09 PROCEDURE — 94640 AIRWAY INHALATION TREATMENT: CPT

## 2020-05-09 PROCEDURE — 80061 LIPID PANEL: CPT

## 2020-05-09 PROCEDURE — 81001 URINALYSIS AUTO W/SCOPE: CPT

## 2020-05-09 PROCEDURE — 85027 COMPLETE CBC AUTOMATED: CPT

## 2020-05-09 PROCEDURE — 70551 MRI BRAIN STEM W/O DYE: CPT

## 2020-05-09 PROCEDURE — 70544 MR ANGIOGRAPHY HEAD W/O DYE: CPT

## 2020-05-09 PROCEDURE — 82962 GLUCOSE BLOOD TEST: CPT

## 2020-05-09 RX ORDER — AMLODIPINE BESYLATE 2.5 MG/1
1 TABLET ORAL
Qty: 0 | Refills: 0 | DISCHARGE

## 2020-05-09 RX ORDER — ASPIRIN/CALCIUM CARB/MAGNESIUM 324 MG
1 TABLET ORAL
Qty: 30 | Refills: 0
Start: 2020-05-09 | End: 2020-06-07

## 2020-05-09 RX ADMIN — BUDESONIDE AND FORMOTEROL FUMARATE DIHYDRATE 2 PUFF(S): 160; 4.5 AEROSOL RESPIRATORY (INHALATION) at 05:12

## 2020-05-09 RX ADMIN — BUDESONIDE AND FORMOTEROL FUMARATE DIHYDRATE 2 PUFF(S): 160; 4.5 AEROSOL RESPIRATORY (INHALATION) at 08:00

## 2020-05-09 RX ADMIN — Medication 325 MILLIGRAM(S): at 12:16

## 2020-05-09 NOTE — DISCHARGE NOTE PROVIDER - NSDCMRMEDTOKEN_GEN_ALL_CORE_FT
aspirin 325 mg oral tablet: 1 tab(s) orally once a day  lovastatin 40 mg oral tablet: 1 tab(s) orally once a day PM  ramipril 5 mg oral tablet: 1 tab(s) orally once a day AM  Symbicort 160 mcg-4.5 mcg/inh inhalation aerosol: 2 puff(s) inhaled 2 times a day  Ventolin HFA 90 mcg/inh inhalation aerosol: 2 puff(s) inhaled 1 to 2 times a day, As Needed amLODIPine 10 mg oral tablet: 1 tab(s) orally once a day  aspirin 325 mg oral tablet: 1 tab(s) orally once a day  lovastatin 40 mg oral tablet: 1 tab(s) orally once a day PM  ramipril 5 mg oral tablet: 1 tab(s) orally once a day AM  Symbicort 160 mcg-4.5 mcg/inh inhalation aerosol: 2 puff(s) inhaled 2 times a day  Ventolin HFA 90 mcg/inh inhalation aerosol: 2 puff(s) inhaled 1 to 2 times a day, As Needed

## 2020-05-09 NOTE — PROGRESS NOTE ADULT - SUBJECTIVE AND OBJECTIVE BOX
This progress note was completed in part by resident acting as telephonic scribe during COVID-19 crisis. Physical exam and review of systems was completed by attending physician, and findings below are those of the attending physician, and have been reviewed in detail.    Patient is a 85y old  Female who presents with a chief complaint of CVA (08 May 2020 09:16)      INTERVAL HPI/OVERNIGHT EVENTS: Pt seen and examined at bedside. Overnight pt's leads on right UE had come off, readjusted, pt asymptomatic at the time.      ICU Vital Signs Last 24 Hrs  T(C): 36.5 (09 May 2020 04:17), Max: 36.8 (08 May 2020 13:02)  T(F): 97.7 (09 May 2020 04:17), Max: 98.2 (08 May 2020 13:02)  HR: 74 (09 May 2020 04:17) (55 - 83)  BP: 146/72 (09 May 2020 04:17) (113/50 - 146/72)  BP(mean): --  ABP: --  ABP(mean): --  RR: 18 (09 May 2020 04:17) (17 - 18)  SpO2: 92% (09 May 2020 04:17) (91% - 94%)      05-09    147<H>  |  107  |  18  ----------------------------<  88  4.9   |  37<H>  |  0.84    Ca    8.8      09 May 2020 07:08    TPro  7.2  /  Alb  3.2<L>  /  TBili  0.4  /  DBili  x   /  AST  14<L>  /  ALT  16  /  AlkPhos  94  05-07          Urinalysis Basic - ( 07 May 2020 13:18 )    Color: Yellow / Appearance: Slightly Turbid / S.015 / pH: x  Gluc: x / Ketone: Negative  / Bili: Negative / Urobili: Negative   Blood: x / Protein: 30 mg/dL / Nitrite: Negative   Leuk Esterase: Negative / RBC: 0-2 /HPF / WBC 0-2   Sq Epi: x / Non Sq Epi: Few / Bacteria: Occasional           MEDICATIONS  (STANDING):  amLODIPine   Tablet 10 milliGRAM(s) Oral daily  aspirin 325 milliGRAM(s) Oral daily  budesonide 160 MICROgram(s)/formoterol 4.5 MICROgram(s) Inhaler 2 Puff(s) Inhalation two times a day  lisinopril 20 milliGRAM(s) Oral daily  simvastatin 40 milliGRAM(s) Oral at bedtime    MEDICATIONS  (PRN):  ALBUTerol    90 MICROgram(s) HFA Inhaler 2 Puff(s) Inhalation every 12 hours PRN Shortness of Breath and/or Wheezing      REVIEW OF SYSTEMS:    Constitutional:  denies fever, chills  Head:  denies headaches  Eyes:  admits loss of vision on the right eye    Ears:  denies tinnitus     Neck:  denies neck pain.   Respiratory:  denies SOB     Cardiovascular:  denies CP     Abdomen: denies nausea, vomiting, or abdominal pain.    Extremities/Neurological:  denies numbness or tingling.    Musculoskeletal:  admits to chronic joint pain     PHYSICAL EXAMINATION:     General: awake, alert   Head:  Normocephalic and atraumatic.    Eyes:  No scleral icterus. EOMI  Ears:  Hearing bilaterally intact.    NECK:  Supple.    RESPIRATORY:  CTA b/l     CARDIOVASCULAR:  S1 and S2 noted     ABDOMEN:  Soft no TTP .   EXTREMITIES:  No clubbing, no cyanosis     NEUROLOGIC: stated incorrect age, was able to say the correct month, EOMI. right hemianopsia. difficulty with expression,   no arm drift, b/l UE motor strength 4+/5 and b/l LE 3/5, sensation intact b/l UE and LE  SKIN: Warm, dry, well-perfused    RADIOLOGY & ADDITIONAL TESTS:    Imaging Personally Reviewed:  [x ] YES  [ ] NO    Consultant(s) Notes Reviewed:  [x ] YES  [ ] NO      Care Discussed with Consultants/Other Providers [x ] YES  [ ] NO

## 2020-05-09 NOTE — DISCHARGE NOTE PROVIDER - CARE PROVIDER_API CALL
Akhil Turk)  Surgery  [address], [address]  [place], [place] [id number]  Phone: [phone]  Fax: [phone]  Established Patient  Follow Up Time: Akhil Turk)  Surgery  [address] [facility], [address]  [place], [place] [id number]  Phone: [phone]  Fax: [phone]  Follow Up Time: 1 week    Yolanda Castellano)  Internal Medicine  [address] [place] [address]  Phone: [phone]  Fax: [phone]  Established Patient  Follow Up Time: 1 week

## 2020-05-09 NOTE — DISCHARGE NOTE NURSING/CASE MANAGEMENT/SOCIAL WORK - PATIENT PORTAL LINK FT
You can access the FollowMyHealth Patient Portal offered by [facility] by registering at the following website: [email or web address]. By joining [facility]’s FollowMyHealth portal, you will also be able to view your health information using other applications (apps) compatible with our system.

## 2020-05-09 NOTE — DISCHARGE NOTE PROVIDER - HOSPITAL COURSE
85F with PMHx of HTN, HLD, COPD (not on home O2), arthritis, melanoma (s/p excision L calf) who presents after 1 day history of expressive aphasia and new R eye blindness admitted for acute vs subacute L MCA infarct.             CVA (cerebral vascular accident).  Plan: CT head shows L MCA infarct--likely causing R hemianopsia and expressive aphasia, however no dysarthria     Bedside dysphagia screen passed- diet as tolerated. formal S+S eval pending    MRI BRAIN: Evolving acute infarct in the left posterior parietal lobe (distal left MCA territory distribution). No evidence of hemorrhagic transformation.    MRA HEAD: No evidence of vascular stenosis, occlusion, aneurysm or vascular malformation.    TTE Carotid sono pending      qd, 40mg atorvastatin qd    Neuro checks Q4, aspiration precautions     A1c, lipid panel, TSH, B12 pending     neuro Dr. Carranza, recs noted    cartoid doppler , b/l plaque 69 % stenosis bilater, dsicussed with valorie will see patient in the office, and discharge on asa/statin                    : Emphysema lung.  Plan: hx COPD, not on home O2    mild hypercarbia noted on BMP, likely her baseline    MCV mildly elevated likely from inc erythropoiesis    CXR noted bibasilar opacities, afebrile, no leukocytosis, likely atelectasis     -negative 5/7/20 for COVID19    c/w home symbicort and ventolin PRN.             Hypercholesterolemia.  Plan: chronic, stable    continue home statin        Hypertension.  chronic, stable    continue home ramipril, amlodipine w/ hold parameters below 190 systolic, to allow for permissive HTN.         : Melanoma. Plan: hx melanoma L calf, s/p excisional removal    no intervention at this time. 85F with PMHx of HTN, HLD, COPD (not on home O2), arthritis, melanoma (s/p excision L calf) who presents after 1 day history of expressive aphasia and new R eye blindness admitted for acute vs subacute L MCA infarct.             CVA (cerebral vascular accident).  Plan: CT head shows L MCA infarct--likely causing R hemianopsia and expressive aphasia, however no dysarthria     Bedside dysphagia screen passed- diet as tolerated. formal S+S eval pending    MRI BRAIN: Evolving acute infarct in the left posterior parietal lobe (distal left MCA territory distribution). No evidence of hemorrhagic transformation.    MRA HEAD: No evidence of vascular stenosis, occlusion, aneurysm or vascular malformation.    TTE Carotid sono pending      qd, 40mg atorvastatin qd    Neuro checks Q4, aspiration precautions     A1c, lipid panel, TSH, B12 pending     neuro Dr. Carranza, recs noted    cartoid doppler , b/l plaque 69 % stenosis bilater, dsicussed with valorie will see patient in the office, and discharge on asa/statin                    : Emphysema lung.  Plan: hx COPD, not on home O2    active smoker , with her spouse,     pt been refusing home 02, even when her pmd was offering given she is actively smokes    offered again at this admission, pt declined,     saturating 83-85%, asympotmatic, likely pt compensated given chronicity of hypoxia    would need f/u with pulm/ond    -negative 5/7/20 for COVID19    c/w home symbicort and ventolin PRN.         active smoker    offered smoking cessation, declined            Hypercholesterolemia.  Plan: chronic, stable    continue home statin        Hypertension.  chronic, stable    continue home ramipril, amlodipine w/ hold parameters below 190 systolic, to allow for permissive HTN.         : Melanoma. Plan: hx melanoma L calf, s/p excisional removal    no intervention at this time. 85F with PMHx of HTN, HLD, COPD (not on home O2), arthritis, melanoma (s/p excision L calf) who presents after 1 day history of expressive aphasia and new R eye blindness admitted for acute vs subacute L MCA infarct.    Pt admitted to [facility] for CVA. CT head showed L MCA infarct--likely causing R hemianopsia and expressive aphasia. MRI brin showed Evolving acute infarct in the left posterior parietal lobe (distal left MCA territory distribution). No evidence of hemorrhagic transformation. MRA head showed no evidence of vascular stenosis, occlusion, aneurysm or vascular malformation. Pt was seen by neurology Dr. Carranza, started on daily aspirin 325, 40 mg atorvastatin, aspiration precautions and stroke protocol was followed. Pt's home BP meds were continued wuith hold parameters to allow for permissive HTN. Pt was found on carotid doppler to have bilateral plaque 50-69 % stenosis. Pt was discussed with vascular, Dr. Turk, pt is to see him in his office outpatient. Pt should be discharged on aspirin 325 qD and atorvastatin. Must also schedule an appointment with Speech and Language Pathology Services through the [facility] hearing and Speech [facility] at .         Pt also had a hx of Emphysema, COPD not on home O2, was continued on home symbicort and ventolin PRN. Pt is an active smoker, declined smoking cessation information or medications. Pt refused home O2, even after being educated. Pt's O2 sats during her stay were 83-85%, however asymptomatic, likely that the pt compensated given chronicity of hypoxia. Pt must follow up with a pulmonologist and her PCP. Her COVID 19 testcame back negative from 5/7/20.         Pt showed mild improvement. Seen by PT who recommended home. Pt safe to discharge home today. 85F with PMHx of HTN, HLD, COPD (not on home O2), arthritis, melanoma (s/p excision L calf) presented after 1 day history of expressive aphasia and new R eye blindness admitted for acute vs subacute L MCA infarct.         Pt admitted to [facility] for CVA. CT head showed L MCA infarct--likely causing R hemianopsia and expressive aphasia. MRI brin showed Evolving acute infarct in the left posterior parietal lobe (distal left MCA territory distribution). No evidence of hemorrhagic transformation. MRA head showed no evidence of vascular stenosis, occlusion, aneurysm or vascular malformation. Pt was seen by neurology Dr. Carranza, started on daily aspirin 325, 40 mg atorvastatin, aspiration precautions and stroke protocol was followed. Pt's home BP meds were continued wuith hold parameters to allow for permissive HTN. Pt was found on carotid doppler to have bilateral plaque 50-69 % stenosis. Pt was discussed with vascular, Dr. Turk, pt is to see him in his office outpatient. Pt should be discharged on aspirin 325 qD and atorvastatin. Must also schedule an appointment with Speech and Language Pathology Services through the [facility] hearing and Speech Center at .         Pt also had a hx of Emphysema, COPD not on home O2, was continued on home symbicort and ventolin PRN. Pt is an active smoker, declined smoking cessation information or medications. Pt REFUSED home O2, even after being educated on the indications, benefits, and risks of not using the O2. Pt's O2 sats during her stay were 83-85%, however asymptomatic, likely that the pt compensated given chronicity of hypoxia. Pt must follow up with a pulmonologist and her PCP. Her COVID 19 testcame back negative from 5/7/20.         Pt showed mild improvement. Seen by PT who recommended home. Patient was seen and examined on the day of discharge and is medically optimized for discharge, with close outpatient follow up.

## 2020-05-09 NOTE — DISCHARGE NOTE PROVIDER - CARE PROVIDERS DIRECT ADDRESSES
,DirectAddress_Unknown ,DirectAddress_Unknown,[email or web address]

## 2020-05-09 NOTE — PROGRESS NOTE ADULT - PROBLEM SELECTOR PLAN 1
CVA likely 2/2 b/l carotid artery stenosis  NIH stroke scale 3   CT head shows L MCA infarct--likely causing R hemianopsia and expressive aphasia  MRI BRAIN: Evolving acute infarct in the left posterior parietal lobe (distal left MCA territory distribution). No evidence of hemorrhagic transformation.  MRA HEAD: No evidence of vascular stenosis, occlusion, aneurysm or vascular malformation.  US 50-69% bilateral internal carotid artery stenosis  Bedside dysphagia screen passed- tolerating DASH diet   speech and language consult given expressive aphasia   TTE pending   aspirin 325 once a day for 2 weeks than 81mg as per neuro reccs   40mg atorvastatin qd  Neuro checks Q4, aspiration precautions   A1c, B12 pending  lipid panel noted, TSH WNL  PT/OT reccs noted  neuro Dr. Carranza, recs noted  consulted vascular Dr. Turk f/u reccs

## 2020-05-09 NOTE — DISCHARGE NOTE PROVIDER - NSDCCPCAREPLAN_GEN_ALL_CORE_FT
PRINCIPAL DISCHARGE DIAGNOSIS  Diagnosis: CVA (cerebral vascular accident)  Assessment and Plan of Treatment: s/p cartoid doppler 50-69%   to see vsacular as outpt  on asa/statin PRINCIPAL DISCHARGE DIAGNOSIS  Diagnosis: CVA (cerebral vascular accident)  Assessment and Plan of Treatment: You were treated for a cerebral vascular accident. You were found to have a blockage in your carotid artieries on both sides of your neck of about 60-69 percent. You must thus see Dr. Turk, a vascular doctor, within 1 week after leaving the hospital. Take aspirin 325 everyday for 14 days, then start 81 mg aspirin daily. Take your statin medication daily. Follow up with your primary care doctor within one week as arcenio. Call to setup an appointment for speech and language patholoy services throught the [facility] ans speech center at .      SECONDARY DISCHARGE DIAGNOSES  Diagnosis: Emphysema lung  Assessment and Plan of Treatment: You must quit smoking. Your oxygen levels are low. See your primary care doctor within 1 week from discharge, they can help you with medications to help you quit smoking and get home oxygen as well. PRINCIPAL DISCHARGE DIAGNOSIS  Diagnosis: CVA (cerebral vascular accident)  Assessment and Plan of Treatment: You were treated for a cerebral vascular accident (stroke). You were found to have a blockage in your carotid artieries on both sides of your neck of about 60-69%.   - Take Aspirin 325 mg daily for 14 days, then start baby Aspirin 81 mg daily.   - Take Lovastatin daily.   - Follow up with Dr. Turk, a vascular doctor, within 1 week after leaving the hospital.   - Follow up with your primary care doctor, Dr. Castellano, within 1 week.   - Call to set up an appointment for speech and language pathology services through the [facility] at .      SECONDARY DISCHARGE DIAGNOSES  Diagnosis: Emphysema lung  Assessment and Plan of Treatment: Your oxygen levels were noted to be low, this is likely from COPD (Emphysema) related to smoking. It is strongly recommended that you quit smoking.   - Continue your home inhalers.   - See Dr. Castellano within 1 week of discharge to discuss medications to help you quit smoking and regarding home oxygen.    Diagnosis: Hypertension  Assessment and Plan of Treatment: - Continue Ramipril and Amlodipine.

## 2020-05-09 NOTE — PROGRESS NOTE ADULT - PROBLEM SELECTOR PLAN 2
EKG shows mild ST elevations from V1-V4, however looks similar to prior, doubt ACS  -no ischemia as per cardio   -EKG showed NSR with incomplete LBBB  - No Afib on tele.  Continue to monitor in setting of CVA   -cardio consulted Dr. Bey: reccs noted

## 2020-05-09 NOTE — SPEECH LANGUAGE PATHOLOGY EVALUATION - COMMENTS
Consult received and chart reviewed. Attempted to see the patient for a formal evaluation of communicative functioning, however, per discussion with RN and chart review, patient being discharged at this time. If patient requires a formal evaluation of swallow function and/or communicative functioning, outpatient SLP services can be scheduled through the [facility] at [phone]. Discussed with RN and call out to referring MD. Consult received and chart reviewed. Attempted to see the patient for a formal evaluation of communicative functioning, however, per discussion with RN and chart review, patient currently being discharged home at this time. If patient requires a formal evaluation of swallow function and/or communicative functioning, outpatient SLP services can be scheduled through the [facility] at [phone]. Discussed with RN and call out to referring MD.

## 2020-05-09 NOTE — PROGRESS NOTE ADULT - SUBJECTIVE AND OBJECTIVE BOX
Neurology follow up note    KATERINA [id number]      Interval History:    Patient feels ok no new complaints.    MEDICATIONS    ALBUTerol    90 MICROgram(s) HFA Inhaler 2 Puff(s) Inhalation every 12 hours PRN  aspirin 325 milliGRAM(s) Oral daily  budesonide 160 MICROgram(s)/formoterol 4.5 MICROgram(s) Inhaler 2 Puff(s) Inhalation two times a day  simvastatin 40 milliGRAM(s) Oral at bedtime      Allergies    No Known Allergies    Intolerances            Vital Signs Last 24 Hrs  T(C): 36.5 (09 May 2020 04:17), Max: 36.8 (08 May 2020 13:02)  T(F): 97.7 (09 May 2020 04:17), Max: 98.2 (08 May 2020 13:02)  HR: 74 (09 May 2020 04:17) (55 - 83)  BP: 146/72 (09 May 2020 04:17) (113/50 - 146/72)  BP(mean): --  RR: 18 (09 May 2020 04:17) (17 - 18)  SpO2: 92% (09 May 2020 04:17) (91% - 94%)    REVIEW OF SYSTEMS:  Constitutional:  The patient denies fever, chills, or night sweats.  Head:  No headaches.  Eyes:  Positive loss of vision on the right eye.  Ears:  No ringing in the ears.  Neck:  No neck pain.  Respiratory:  No shortness of breath.  Cardiovascular:  No chest pain.  Abdomen:  No nausea, vomiting, or abdominal pain.  Extremities/Neurological:  No numbness or tingling.  Musculoskeletal:  Positive history of joint pain from arthritis.    PHYSICAL EXAMINATION:   HEENT:  Head:  Normocephalic and atraumatic.  Eyes:  No scleral icterus.  Ears:  Hearing bilaterally was intact.  NECK:  Supple.  RESPIRATORY:  Good air entry bilaterally.  CARDIOVASCULAR:  S1 and S2 are heard.  ABDOMEN:  Soft and nontender.  EXTREMITIES:  No clubbing or cyanosis were noted.      NEUROLOGIC:  The patient is awake and alert.  Said the incorrect age, was able to say the correct month, was able to name simple objects.  Extraocular movements were intact.  The patient has a right-sided field cut, a right hemianopsia.  Speech:  No dysarthria was noted, but the patient does have problems expressing herself at times saying the correct things.  Motor:  Bilateral uppers 4+/5 and bilateral lowers 3-/5, but has arthritis in both legs.  Sensory:  Bilateral uppers and lowers intact to light touch.  No drift.              LABS:  CBC Full  -  ( 09 May 2020 07:08 )  WBC Count : 6.34 K/uL  RBC Count : 4.32 M/uL  Hemoglobin : 13.5 g/dL  Hematocrit : 45.0 %  Platelet Count - Automated : 201 K/uL  Mean Cell Volume : 104.2 fl  Mean Cell Hemoglobin : 31.3 pg  Mean Cell Hemoglobin Concentration : 30.0 gm/dL  Auto Neutrophil # : x  Auto Lymphocyte # : x  Auto Monocyte # : x  Auto Eosinophil # : x  Auto Basophil # : x  Auto Neutrophil % : x  Auto Lymphocyte % : x  Auto Monocyte % : x  Auto Eosinophil % : x  Auto Basophil % : x    Urinalysis Basic - ( 07 May 2020 13:18 )    Color: Yellow / Appearance: Slightly Turbid / S.015 / pH: x  Gluc: x / Ketone: Negative  / Bili: Negative / Urobili: Negative   Blood: x / Protein: 30 mg/dL / Nitrite: Negative   Leuk Esterase: Negative / RBC: 0-2 /HPF / WBC 0-2   Sq Epi: x / Non Sq Epi: Few / Bacteria: Occasional      -    147<H>  |  107  |  18  ----------------------------<  88  4.9   |  37<H>  |  0.84    Ca    8.8      09 May 2020 07:08    TPro  7.2  /  Alb  3.2<L>  /  TBili  0.4  /  DBili  x   /  AST  14<L>  /  ALT  16  /  AlkPhos  94  05-07    Hemoglobin A1C:   Lipid Panel  @ 11:30  Total Cholesterol, Serum 117  LDL 64  Triglycerides 78    LIVER FUNCTIONS - ( 07 May 2020 12:10 )  Alb: 3.2 g/dL / Pro: 7.2 g/dL / ALK PHOS: 94 U/L / ALT: 16 U/L / AST: 14 U/L / GGT: x           Vitamin B12         RADIOLOGY    ANALYSIS AND PLAN:  This an 85-year-old with hemianopsia, expressive aphasia.  1.	For hemianopsia and expressive aphasia, cerebrovascular accident    The patient's NIH stroke scale would be 3.  2.	I will plan for CT imaging of the brain.  Based on that, the patient will need additional test with MRI possibly with and without contrast.  3.	MRI positive for CVA  4.	aspirin 325 once a day for 2 weeks than 81mg  5.	We will continue the patient on her cholesterol medication.  6.	Monitor respiratory status for history of chronic obstructive pulmonary disease.  7.	The patient will need echocardiogram.  8.	carotid doppler noted vascular follow up   9.	spoke to spouse yesterday   10.	Greater than 40 minutes of time was spent with the patient, plan of care, reviewing data, speaking to multidisciplinary healthcare team.

## 2020-05-09 NOTE — DISCHARGE NOTE NURSING/CASE MANAGEMENT/SOCIAL WORK - NSDCPEPTSTRK_GEN_ALL_CORE
Need for follow up after discharge/Signs and symptoms of stroke/Stroke support groups for patients, families, and friends/Risk factors for stroke/Stroke education booklet/Stroke warning signs and symptoms/Call 911 for stroke/Prescribed medications

## 2020-05-09 NOTE — DISCHARGE NOTE PROVIDER - NSDCQMSTROKERISK_NEU_ALL_CORE
High blood pressure/High cholesterol/History of a stroke or TIA Tobacco use/High blood pressure/History of a stroke or TIA/High cholesterol

## 2020-05-09 NOTE — DISCHARGE NOTE PROVIDER - PROVIDER TOKENS
PROVIDER:[TOKEN:[[id number]],ESTABLISHEDPATIENT:[T]] PROVIDER:[TOKEN:[[id number]],FOLLOWUP:[1 week]],PROVIDER:[TOKEN:[[id number]],FOLLOWUP:[1 week],ESTABLISHEDPATIENT:[T]]

## 2020-05-26 ENCOUNTER — APPOINTMENT (OUTPATIENT)
Dept: VASCULAR SURGERY | Facility: CLINIC | Age: 85
End: 2020-05-26
Payer: MEDICARE

## 2020-05-26 VITALS — HEIGHT: 59 IN | BODY MASS INDEX: 34.27 KG/M2 | WEIGHT: 170 LBS | TEMPERATURE: 97.8 F

## 2020-05-26 PROCEDURE — 93880 EXTRACRANIAL BILAT STUDY: CPT

## 2020-05-26 PROCEDURE — 99203 OFFICE O/P NEW LOW 30 MIN: CPT

## 2020-05-26 RX ORDER — SODIUM HYPOCHLORITE 1.25 MG/ML
0.12 SOLUTION TOPICAL
Qty: 1 | Refills: 0 | Status: COMPLETED | COMMUNITY
Start: 2019-11-14 | End: 2020-05-26

## 2020-05-26 RX ORDER — CLINDAMYCIN HYDROCHLORIDE 300 MG/1
300 CAPSULE ORAL 3 TIMES DAILY
Qty: 21 | Refills: 0 | Status: COMPLETED | COMMUNITY
Start: 2019-10-29 | End: 2020-05-26

## 2020-05-26 RX ORDER — SOFT LENS RINSE,STORE SOLUTION
0.9 SOLUTION, NON-ORAL MISCELLANEOUS
Qty: 1 | Refills: 3 | Status: COMPLETED | COMMUNITY
Start: 2019-11-07 | End: 2020-05-26

## 2020-05-26 NOTE — HISTORY OF PRESENT ILLNESS
[FreeTextEntry1] : 85-year-old female recently presented to [facility] with complaints of aphasia.  At that time patient underwent brain scan which showed left-sided CVA.  In addition, she underwent a carotid duplex study which showed moderate carotid stenosis.  She states in the office today she has improved.  She has recently stopped smoking.

## 2020-05-26 NOTE — ASSESSMENT
[FreeTextEntry1] : Patient underwent a repeat carotid duplex which shows minimal disease bilaterally.  At this time I do not believe the cause of her CVA was carotid disease.  She can follow-up with me as needed.

## 2020-05-26 NOTE — PHYSICAL EXAM
[JVD] : no jugular venous distention  [Normal Breath Sounds] : Normal breath sounds [Normal Rate and Rhythm] : normal rate and rhythm [2+] : left 2+ [Varicose Veins Of Lower Extremities] : not present [Ankle Swelling (On Exam)] : not present [] : not present [No Rash or Lesion] : No rash or lesion [Abdomen Tenderness] : ~T ~M No abdominal tenderness [Skin Ulcer] : no ulcer [Calm] : calm [Alert] : alert [de-identified] : Appears well

## 2020-05-26 NOTE — CONSULT LETTER
[Dear  ___] : Dear  [unfilled], [Courtesy Letter:] : I had the pleasure of seeing your patient, [unfilled], in my office today. [Please see my note below.] : Please see my note below. [Sincerely,] : Sincerely, [FreeTextEntry3] : Ab Schulte M.D., JOSE RAUL., R.P.V.I.\par \par  [facility]\par  of  Surgery\par Assistant Professor of Radiology\par Vascular Surgery at [facility]

## 2020-06-01 NOTE — ED ADULT NURSE NOTE - NS ED NURSE LEVEL OF CONSCIOUSNESS AFFECT
Calm Electrodesiccation Text: The wound bed was treated with electrodesiccation after the biopsy was performed.

## 2020-10-30 ENCOUNTER — EMERGENCY (EMERGENCY)
Facility: HOSPITAL | Age: 85
LOS: 1 days | Discharge: HOME | End: 2020-10-30
Attending: EMERGENCY MEDICINE | Admitting: EMERGENCY MEDICINE
Payer: COMMERCIAL

## 2020-10-30 VITALS
RESPIRATION RATE: 16 BRPM | OXYGEN SATURATION: 95 % | HEART RATE: 80 BPM | DIASTOLIC BLOOD PRESSURE: 65 MMHG | TEMPERATURE: 98 F | SYSTOLIC BLOOD PRESSURE: 129 MMHG

## 2020-10-30 VITALS
SYSTOLIC BLOOD PRESSURE: 127 MMHG | TEMPERATURE: 97 F | HEIGHT: 56 IN | HEART RATE: 81 BPM | WEIGHT: 169.98 LBS | DIASTOLIC BLOOD PRESSURE: 67 MMHG | OXYGEN SATURATION: 96 % | RESPIRATION RATE: 18 BRPM

## 2020-10-30 DIAGNOSIS — H26.9 UNSPECIFIED CATARACT: Chronic | ICD-10-CM

## 2020-10-30 DIAGNOSIS — C50.919 MALIGNANT NEOPLASM OF UNSPECIFIED SITE OF UNSPECIFIED FEMALE BREAST: Chronic | ICD-10-CM

## 2020-10-30 DIAGNOSIS — M12.9 ARTHROPATHY, UNSPECIFIED: Chronic | ICD-10-CM

## 2020-10-30 PROCEDURE — 99283 EMERGENCY DEPT VISIT LOW MDM: CPT

## 2020-10-30 RX ORDER — CEPHALEXIN 500 MG
1 CAPSULE ORAL
Qty: 20 | Refills: 0
Start: 2020-10-30 | End: 2020-11-08

## 2020-10-30 NOTE — ED ADULT NURSE NOTE - OBJECTIVE STATEMENT
patient alert and oriented from home, patient states she was getting out of the car when the car door hit the front of her left leg and it started bleeding.  pt states she is on aspirin therapy.  pt denies pain at this time, pt denies falling, dizziness, numbness, tingling.  pt presents with deep lac on front of left leg below the knee

## 2020-10-30 NOTE — ED PROVIDER NOTE - CARE PROVIDER_API CALL
Himanshu Almodovar  PODIATRIC MEDICINE AND SURGERY  [address] [place] [address]  Phone: [phone]  Fax: [phone]  Follow Up Time:

## 2020-10-30 NOTE — ED PROVIDER NOTE - NSFOLLOWUPINSTRUCTIONS_ED_ALL_ED_FT
Please make sure you observe for any signs of infection: fevers, swelling, redness, discharge.  Please make sure you follow up with wound care center and complete your antibiotics as prescribed.

## 2020-10-30 NOTE — ED PROVIDER NOTE - OBJECTIVE STATEMENT
84 y/o F with c/o left anterior leg laceration from a car door today.  pt is up to date on tetanus.  No other trauma.

## 2020-10-30 NOTE — ED PROVIDER NOTE - PATIENT PORTAL LINK FT
You can access the FollowMyHealth Patient Portal offered by [facility] by registering at the following website: [email or web address]. By joining [facility]’s FollowMyHealth portal, you will also be able to view your health information using other applications (apps) compatible with our system.

## 2020-10-30 NOTE — ED PROVIDER NOTE - CLINICAL SUMMARY MEDICAL DECISION MAKING FREE TEXT BOX
left anterior leg laceration/skin tear.  wound care, no suturing.  dc with oral prophylactic antibiotics and wound care center

## 2020-10-30 NOTE — ED ADULT NURSE NOTE - NSIMPLEMENTINTERV_GEN_ALL_ED
Implemented All Fall with Harm Risk Interventions:  [place] to call system. Call bell, personal items and telephone within reach. Instruct patient to call for assistance. Room bathroom lighting operational. Non-slip footwear when patient is off stretcher. Physically safe environment: no spills, clutter or unnecessary equipment. Stretcher in lowest position, wheels locked, appropriate side rails in place. Provide visual cue, wrist band, yellow gown, etc. Monitor gait and stability. Monitor for mental status changes and reorient to person, place, and time. Review medications for side effects contributing to fall risk. Reinforce activity limits and safety measures with patient and family. Provide visual clues: red socks.

## 2020-12-12 NOTE — PROGRESS NOTE ADULT - PROBLEM SELECTOR PLAN 6
If you are a smoker, it is important for your health to stop smoking. Please be aware that second hand smoke is also harmful.
hx melanoma L calf, s/p excisional removal  no intervention at this time
hx melanoma L calf, s/p excisional removal  no intervention at this time

## 2021-03-22 NOTE — ED PROVIDER NOTE - CONSTITUTIONAL NEGATIVE STATEMENT, MLM
Patient called the office today and wanted to make us aware that she did have an appt with urology but not until 4/20. She was wondering if Dr. Vern Gaona would prescribe supplies since her appt is not until then. I told her it looked like this had already been addressed and Dr. Vern Gaona wanted this to come from urology but I would certainly make her and her CMA aware of her appt date and time. no fever and no chills.

## 2022-02-01 ENCOUNTER — APPOINTMENT (OUTPATIENT)
Dept: UROLOGY | Facility: CLINIC | Age: 87
End: 2022-02-01
Payer: MEDICARE

## 2022-02-01 VITALS
WEIGHT: 160 LBS | DIASTOLIC BLOOD PRESSURE: 70 MMHG | SYSTOLIC BLOOD PRESSURE: 169 MMHG | HEIGHT: 59 IN | TEMPERATURE: 97 F | BODY MASS INDEX: 32.25 KG/M2 | RESPIRATION RATE: 17 BRPM

## 2022-02-01 DIAGNOSIS — E27.8 OTHER SPECIFIED DISORDERS OF ADRENAL GLAND: ICD-10-CM

## 2022-02-01 PROCEDURE — 99203 OFFICE O/P NEW LOW 30 MIN: CPT

## 2022-02-01 RX ORDER — ALBUTEROL SULFATE 90 UG/1
108 (90 BASE) AEROSOL, METERED RESPIRATORY (INHALATION)
Qty: 18 | Refills: 0 | Status: DISCONTINUED | COMMUNITY
Start: 2020-01-21 | End: 2022-02-01

## 2022-02-01 RX ORDER — RAMIPRIL 5 MG/1
5 CAPSULE ORAL
Refills: 0 | Status: DISCONTINUED | COMMUNITY
End: 2022-02-01

## 2022-02-01 RX ORDER — BUDESONIDE AND FORMOTEROL FUMARATE DIHYDRATE 160; 4.5 UG/1; UG/1
160-4.5 AEROSOL RESPIRATORY (INHALATION)
Refills: 0 | Status: DISCONTINUED | COMMUNITY
End: 2022-02-01

## 2022-02-01 RX ORDER — DILTIAZEM HYDROCHLORIDE 360 MG/1
360 CAPSULE, EXTENDED RELEASE ORAL
Refills: 0 | Status: ACTIVE | COMMUNITY

## 2022-02-01 RX ORDER — FLUCONAZOLE 150 MG/1
150 TABLET ORAL
Qty: 1 | Refills: 0 | Status: DISCONTINUED | COMMUNITY
Start: 2019-10-29 | End: 2022-02-01

## 2022-02-01 RX ORDER — SPIRONOLACTONE 25 MG/1
25 TABLET ORAL
Refills: 0 | Status: ACTIVE | COMMUNITY

## 2022-02-01 RX ORDER — AMLODIPINE BESYLATE 10 MG/1
10 TABLET ORAL
Refills: 0 | Status: DISCONTINUED | COMMUNITY
End: 2022-02-01

## 2022-02-01 RX ORDER — LOVASTATIN 40 MG/1
TABLET ORAL
Refills: 0 | Status: DISCONTINUED | COMMUNITY
End: 2022-02-01

## 2022-02-01 RX ORDER — ATORVASTATIN CALCIUM 10 MG/1
10 TABLET, FILM COATED ORAL
Refills: 0 | Status: ACTIVE | COMMUNITY

## 2022-02-01 RX ORDER — ALBUTEROL 90 MCG
AEROSOL (GRAM) INHALATION
Refills: 0 | Status: DISCONTINUED | COMMUNITY
End: 2022-02-01

## 2022-02-01 NOTE — HISTORY OF PRESENT ILLNESS
[FreeTextEntry1] : Seen in 2017 for adrenal adenomas .We did a repeat MRI in 2017 . MRI consistent with adrenal adenoma bilaterally Melanoma was treated and excised by Dr JARETT Roman and Dr Singh Plastic surgery . She sees Dr Schulte for venous status

## 2022-02-01 NOTE — ASSESSMENT
[FreeTextEntry1] : Images and reports were reviewed . PET scan favors adrenal adenoma .She has no complaints . She has COPD and slight difficulty breathing \par Seeing GYN for the pelvic mass \par Adenomas appear stable

## 2022-02-01 NOTE — PHYSICAL EXAM
[General Appearance - Well Developed] : well developed [General Appearance - Well Nourished] : well nourished [Heart Rate And Rhythm] : Heart rate and rhythm were normal [] : no respiratory distress [Abdomen Soft] : soft [No Focal Deficits] : no focal deficits [Oriented To Time, Place, And Person] : oriented to person, place, and time [No Palpable Adenopathy] : no palpable adenopathy [FreeTextEntry1] : ambulates with a walker  same name as above

## 2022-02-02 NOTE — ED ADULT NURSE NOTE - SKIN INTEGRITY
Date of Service: 2022    INTERNAL MEDICINE CONSULTATION     REASON FOR VISIT:    Here at this time at the request of Dr. Daniel Cervantes prior to having right deep gastrocnemius recession, right 2nd and 3rd metatarsal shortening osteotomies with possible plantar plate repair capsulotomy of the right foot.  The patient has had metatarsal pain of the right foot for more than a year, particularly bothersome over the last 7 months.  He is unable to do a significant hiking that he likes to do as a hobby.  The patient also has a bunion on the right great toe.    SOCIAL HISTORY:    The patient works in quality engineering.  The patient is a nonsmoker, 4-5 caffeinated beverages per day, 3 alcoholic beverages per day.  No history of withdrawal.  The patient has 1 daughter, 1 son, both alive in good health.  Three stepchildren in good health.    FAMILY HISTORY:    The patient's father  from lung cancer complications.  Also had prior back problems.  Father has lung cancer, but is still alive.  Mother  from lung cancer as well.  The patient has 1 sister with COPD, 1 sister is in perfect health, a half-sister in good health.  The patient's prior operations include a left hand ganglionectomy, wisdom teeth extraction, lumbar fusion at L5-S1.    REVIEW OF SYSTEMS:    He does have a history of allergic rhinitis and is on Allegra and Singulair.  He does have some rosacea rash that was treated with cream.  The patient denies any chest pain, orthopnea or PND.  Denies polyuria, polydipsia, denies constipation, easy bleeding.  Denies any weakness or numbness.  Denies any shortness of breath, denies any wheezing.  Denies any bruising.  Denies nocturia or voiding issues.    PHYSICAL EXAMINATION:    GENERAL:  Shows some rosacea on the face.  VITAL SIGNS:  Weight 213 pounds.  Heart rate 90 and regular, pulse ox 97% on room air, blood pressure 120/80 right arm sitting.  HEENT:  Normocephalic trauma.  DANDY.  Srinath bingham.  Adam  benign.  Ears with no residual membrane and canal bilaterally.  Nose normal.  No sinus tenderness.  Throat clear.  Teeth good repair.  NECK:  Supple.  No thyromegaly.  LUNGS:  Clear to A&P.  HEART:  Shows regular rate and rhythm without murmur or gallop noted.  ABDOMEN:  Benign, without organomegaly or mass.  Normal phallus, normal testes, no evidence of hernia.  No inguinal abnormalities.  Pulses full to dorsalis pedis.  No carotid, femoral bruits.  No adenopathy of the cervical, axillary, or inguinal area.   MENTAL STATUS EXAM:  Normal.    NEUROLOGIC: Cranial nerves II-XII intact. Gait and station normal, motor and sensory exam in all 4 extremities normal. Deep tendon reflexes are symmetric and normoactive at biceps, triceps, knees, and ankles.  No adenopathy of the cervical, axillary, inguinal area.  No carotid, femoral or abdominal bruits.  Right foot with bogginess of the 1st, 2nd and 3rd metatarsal head areas.  Small bunion of the right great toe, slight tenderness to palpation of the plantar surface of the 2nd and 3rd metatarsal heads.  No redness, warmth or ecchymosis noted of the right foot.  Good dorsalis pedis and posterior pulses bilaterally.  LYMPHATICS:  No adenopathy of the cervical, axillary, inguinal area.  No carotid, femoral bruits.  Good capillary refill of both feet.  ekg normal lab ok  ASSESSMENT AND PLAN:    1.  Right foot metatarsalgia. Symptomatic with negative rheumatologic serology screening and negative uric acid.  2.  History of allergic rhinitis.    PLAN:    I see no medical contraindication for procedure is planned.  He has been well instructed by Dr. Cervantes and his staff.  Avoid anti-inflammatories and aspirin for 1 week prior to the planned procedure.  Surgery is scheduled at [facility] graft in 02/04/2022.  Routine medical followup yearly.      Dictated By: Braydon Reagan MD  Signing Provider: MD JANETT Pfeiffer/tisha ([id number])   DD: 01/31/2022 2:46:07 PM TD:  02/02/2022 3:15:56 PM         laceration to the left calf/intact

## 2022-03-15 ENCOUNTER — INPATIENT (INPATIENT)
Facility: HOSPITAL | Age: 87
LOS: 2 days | Discharge: HOME | DRG: 189 | End: 2022-03-18
Attending: FAMILY MEDICINE | Admitting: INTERNAL MEDICINE
Payer: COMMERCIAL

## 2022-03-15 VITALS
OXYGEN SATURATION: 87 % | RESPIRATION RATE: 22 BRPM | HEIGHT: 56 IN | SYSTOLIC BLOOD PRESSURE: 144 MMHG | WEIGHT: 160.06 LBS | HEART RATE: 82 BPM | DIASTOLIC BLOOD PRESSURE: 58 MMHG | TEMPERATURE: 99 F

## 2022-03-15 DIAGNOSIS — I63.9 CEREBRAL INFARCTION, UNSPECIFIED: ICD-10-CM

## 2022-03-15 DIAGNOSIS — I10 ESSENTIAL (PRIMARY) HYPERTENSION: ICD-10-CM

## 2022-03-15 DIAGNOSIS — C50.919 MALIGNANT NEOPLASM OF UNSPECIFIED SITE OF UNSPECIFIED FEMALE BREAST: Chronic | ICD-10-CM

## 2022-03-15 DIAGNOSIS — H26.9 UNSPECIFIED CATARACT: Chronic | ICD-10-CM

## 2022-03-15 DIAGNOSIS — J44.1 CHRONIC OBSTRUCTIVE PULMONARY DISEASE WITH (ACUTE) EXACERBATION: ICD-10-CM

## 2022-03-15 DIAGNOSIS — M12.9 ARTHROPATHY, UNSPECIFIED: Chronic | ICD-10-CM

## 2022-03-15 DIAGNOSIS — Z29.9 ENCOUNTER FOR PROPHYLACTIC MEASURES, UNSPECIFIED: ICD-10-CM

## 2022-03-15 DIAGNOSIS — E78.5 HYPERLIPIDEMIA, UNSPECIFIED: ICD-10-CM

## 2022-03-15 DIAGNOSIS — I50.9 HEART FAILURE, UNSPECIFIED: ICD-10-CM

## 2022-03-15 LAB
ALBUMIN SERPL ELPH-MCNC: 3.5 G/DL — SIGNIFICANT CHANGE UP (ref 3.3–5)
ALP SERPL-CCNC: 90 U/L — SIGNIFICANT CHANGE UP (ref 40–120)
ALT FLD-CCNC: 27 U/L — SIGNIFICANT CHANGE UP (ref 12–78)
ANION GAP SERPL CALC-SCNC: 4 MMOL/L — LOW (ref 5–17)
AST SERPL-CCNC: 16 U/L — SIGNIFICANT CHANGE UP (ref 15–37)
BASE EXCESS BLDA CALC-SCNC: -1.3 MMOL/L — SIGNIFICANT CHANGE UP (ref -2–3)
BASOPHILS # BLD AUTO: 0.12 K/UL — SIGNIFICANT CHANGE UP (ref 0–0.2)
BASOPHILS NFR BLD AUTO: 1.4 % — SIGNIFICANT CHANGE UP (ref 0–2)
BILIRUB SERPL-MCNC: 0.4 MG/DL — SIGNIFICANT CHANGE UP (ref 0.2–1.2)
BLOOD GAS COMMENTS ARTERIAL: SIGNIFICANT CHANGE UP
BUN SERPL-MCNC: 21 MG/DL — SIGNIFICANT CHANGE UP (ref 7–23)
CALCIUM SERPL-MCNC: 9.5 MG/DL — SIGNIFICANT CHANGE UP (ref 8.5–10.1)
CHLORIDE SERPL-SCNC: 109 MMOL/L — HIGH (ref 96–108)
CO2 SERPL-SCNC: 28 MMOL/L — SIGNIFICANT CHANGE UP (ref 22–31)
CREAT SERPL-MCNC: 1 MG/DL — SIGNIFICANT CHANGE UP (ref 0.5–1.3)
D DIMER BLD IA.RAPID-MCNC: <150 NG/ML DDU — SIGNIFICANT CHANGE UP
EGFR: 55 ML/MIN/1.73M2 — LOW
EOSINOPHIL # BLD AUTO: 0.24 K/UL — SIGNIFICANT CHANGE UP (ref 0–0.5)
EOSINOPHIL NFR BLD AUTO: 2.9 % — SIGNIFICANT CHANGE UP (ref 0–6)
GLUCOSE SERPL-MCNC: 109 MG/DL — HIGH (ref 70–99)
HCO3 BLDA-SCNC: 24 MMOL/L — SIGNIFICANT CHANGE UP (ref 21–28)
HCT VFR BLD CALC: 46.4 % — HIGH (ref 34.5–45)
HGB BLD-MCNC: 15.1 G/DL — SIGNIFICANT CHANGE UP (ref 11.5–15.5)
IMM GRANULOCYTES NFR BLD AUTO: 0.4 % — SIGNIFICANT CHANGE UP (ref 0–1.5)
LACTATE SERPL-SCNC: 0.5 MMOL/L — LOW (ref 0.7–2)
LYMPHOCYTES # BLD AUTO: 1.94 K/UL — SIGNIFICANT CHANGE UP (ref 1–3.3)
LYMPHOCYTES # BLD AUTO: 23.1 % — SIGNIFICANT CHANGE UP (ref 13–44)
MCHC RBC-ENTMCNC: 31.5 PG — SIGNIFICANT CHANGE UP (ref 27–34)
MCHC RBC-ENTMCNC: 32.5 GM/DL — SIGNIFICANT CHANGE UP (ref 32–36)
MCV RBC AUTO: 96.7 FL — SIGNIFICANT CHANGE UP (ref 80–100)
MONOCYTES # BLD AUTO: 0.49 K/UL — SIGNIFICANT CHANGE UP (ref 0–0.9)
MONOCYTES NFR BLD AUTO: 5.8 % — SIGNIFICANT CHANGE UP (ref 2–14)
NEUTROPHILS # BLD AUTO: 5.57 K/UL — SIGNIFICANT CHANGE UP (ref 1.8–7.4)
NEUTROPHILS NFR BLD AUTO: 66.4 % — SIGNIFICANT CHANGE UP (ref 43–77)
NRBC # BLD: 0 /100 WBCS — SIGNIFICANT CHANGE UP (ref 0–0)
NT-PROBNP SERPL-SCNC: 666 PG/ML — HIGH (ref 0–450)
PCO2 BLDA: 44 MMHG — HIGH (ref 32–35)
PH BLDA: 7.35 — SIGNIFICANT CHANGE UP (ref 7.35–7.45)
PLATELET # BLD AUTO: 265 K/UL — SIGNIFICANT CHANGE UP (ref 150–400)
PO2 BLDA: 95 MMHG — SIGNIFICANT CHANGE UP (ref 83–108)
POTASSIUM SERPL-MCNC: 4.8 MMOL/L — SIGNIFICANT CHANGE UP (ref 3.5–5.3)
POTASSIUM SERPL-SCNC: 4.8 MMOL/L — SIGNIFICANT CHANGE UP (ref 3.5–5.3)
PROT SERPL-MCNC: 7.1 G/DL — SIGNIFICANT CHANGE UP (ref 6–8.3)
RAPID RVP RESULT: SIGNIFICANT CHANGE UP
RBC # BLD: 4.8 M/UL — SIGNIFICANT CHANGE UP (ref 3.8–5.2)
RBC # FLD: 13.2 % — SIGNIFICANT CHANGE UP (ref 10.3–14.5)
SAO2 % BLDA: 97.9 % — SIGNIFICANT CHANGE UP (ref 94–98)
SARS-COV-2 RNA SPEC QL NAA+PROBE: SIGNIFICANT CHANGE UP
SODIUM SERPL-SCNC: 141 MMOL/L — SIGNIFICANT CHANGE UP (ref 135–145)
TROPONIN I, HIGH SENSITIVITY RESULT: 17.9 NG/L — SIGNIFICANT CHANGE UP
WBC # BLD: 8.39 K/UL — SIGNIFICANT CHANGE UP (ref 3.8–10.5)
WBC # FLD AUTO: 8.39 K/UL — SIGNIFICANT CHANGE UP (ref 3.8–10.5)

## 2022-03-15 PROCEDURE — 93010 ELECTROCARDIOGRAM REPORT: CPT

## 2022-03-15 PROCEDURE — 71045 X-RAY EXAM CHEST 1 VIEW: CPT | Mod: 26

## 2022-03-15 PROCEDURE — 71250 CT THORAX DX C-: CPT | Mod: 26

## 2022-03-15 PROCEDURE — 99223 1ST HOSP IP/OBS HIGH 75: CPT | Mod: GC,25

## 2022-03-15 PROCEDURE — 99284 EMERGENCY DEPT VISIT MOD MDM: CPT

## 2022-03-15 PROCEDURE — 99497 ADVNCD CARE PLAN 30 MIN: CPT | Mod: 25

## 2022-03-15 RX ORDER — DILTIAZEM HCL 120 MG
1 CAPSULE, EXT RELEASE 24 HR ORAL
Qty: 0 | Refills: 0 | DISCHARGE

## 2022-03-15 RX ORDER — TIOTROPIUM BROMIDE 18 UG/1
1 CAPSULE ORAL; RESPIRATORY (INHALATION) DAILY
Refills: 0 | Status: DISCONTINUED | OUTPATIENT
Start: 2022-03-15 | End: 2022-03-18

## 2022-03-15 RX ORDER — ALBUTEROL 90 UG/1
2 AEROSOL, METERED ORAL
Qty: 0 | Refills: 0 | DISCHARGE

## 2022-03-15 RX ORDER — LOVASTATIN 20 MG
1 TABLET ORAL
Qty: 0 | Refills: 0 | DISCHARGE

## 2022-03-15 RX ORDER — RAMIPRIL 5 MG
1 CAPSULE ORAL
Qty: 0 | Refills: 0 | DISCHARGE

## 2022-03-15 RX ORDER — ATORVASTATIN CALCIUM 80 MG/1
10 TABLET, FILM COATED ORAL AT BEDTIME
Refills: 0 | Status: DISCONTINUED | OUTPATIENT
Start: 2022-03-15 | End: 2022-03-18

## 2022-03-15 RX ORDER — BUDESONIDE AND FORMOTEROL FUMARATE DIHYDRATE 160; 4.5 UG/1; UG/1
2 AEROSOL RESPIRATORY (INHALATION)
Refills: 0 | Status: DISCONTINUED | OUTPATIENT
Start: 2022-03-15 | End: 2022-03-18

## 2022-03-15 RX ORDER — SPIRONOLACTONE 25 MG/1
1 TABLET, FILM COATED ORAL
Qty: 0 | Refills: 0 | DISCHARGE

## 2022-03-15 RX ORDER — ALBUTEROL 90 UG/1
2 AEROSOL, METERED ORAL EVERY 6 HOURS
Refills: 0 | Status: DISCONTINUED | OUTPATIENT
Start: 2022-03-15 | End: 2022-03-18

## 2022-03-15 RX ORDER — DILTIAZEM HCL 120 MG
300 CAPSULE, EXT RELEASE 24 HR ORAL DAILY
Refills: 0 | Status: DISCONTINUED | OUTPATIENT
Start: 2022-03-15 | End: 2022-03-18

## 2022-03-15 RX ORDER — ACETAMINOPHEN 500 MG
650 TABLET ORAL EVERY 6 HOURS
Refills: 0 | Status: DISCONTINUED | OUTPATIENT
Start: 2022-03-15 | End: 2022-03-18

## 2022-03-15 RX ORDER — IPRATROPIUM/ALBUTEROL SULFATE 18-103MCG
3 AEROSOL WITH ADAPTER (GRAM) INHALATION
Refills: 0 | Status: COMPLETED | OUTPATIENT
Start: 2022-03-15 | End: 2022-03-15

## 2022-03-15 RX ORDER — SPIRONOLACTONE 25 MG/1
25 TABLET, FILM COATED ORAL DAILY
Refills: 0 | Status: DISCONTINUED | OUTPATIENT
Start: 2022-03-15 | End: 2022-03-18

## 2022-03-15 RX ORDER — BUDESONIDE AND FORMOTEROL FUMARATE DIHYDRATE 160; 4.5 UG/1; UG/1
2 AEROSOL RESPIRATORY (INHALATION)
Qty: 0 | Refills: 0 | DISCHARGE

## 2022-03-15 RX ORDER — ONDANSETRON 8 MG/1
4 TABLET, FILM COATED ORAL EVERY 8 HOURS
Refills: 0 | Status: DISCONTINUED | OUTPATIENT
Start: 2022-03-15 | End: 2022-03-18

## 2022-03-15 RX ORDER — ENOXAPARIN SODIUM 100 MG/ML
40 INJECTION SUBCUTANEOUS EVERY 24 HOURS
Refills: 0 | Status: DISCONTINUED | OUTPATIENT
Start: 2022-03-15 | End: 2022-03-18

## 2022-03-15 RX ORDER — ALPRAZOLAM 0.25 MG
0.25 TABLET ORAL EVERY 8 HOURS
Refills: 0 | Status: DISCONTINUED | OUTPATIENT
Start: 2022-03-15 | End: 2022-03-18

## 2022-03-15 RX ORDER — AMLODIPINE BESYLATE 2.5 MG/1
1 TABLET ORAL
Qty: 0 | Refills: 0 | DISCHARGE

## 2022-03-15 RX ORDER — ATORVASTATIN CALCIUM 80 MG/1
1 TABLET, FILM COATED ORAL
Qty: 0 | Refills: 0 | DISCHARGE

## 2022-03-15 RX ORDER — LANOLIN ALCOHOL/MO/W.PET/CERES
3 CREAM (GRAM) TOPICAL AT BEDTIME
Refills: 0 | Status: DISCONTINUED | OUTPATIENT
Start: 2022-03-15 | End: 2022-03-18

## 2022-03-15 RX ORDER — CHOLECALCIFEROL (VITAMIN D3) 125 MCG
1 CAPSULE ORAL
Qty: 0 | Refills: 0 | DISCHARGE

## 2022-03-15 RX ADMIN — ENOXAPARIN SODIUM 40 MILLIGRAM(S): 100 INJECTION SUBCUTANEOUS at 22:00

## 2022-03-15 RX ADMIN — Medication 3 MILLIGRAM(S): at 22:00

## 2022-03-15 RX ADMIN — Medication 3 MILLILITER(S): at 13:32

## 2022-03-15 RX ADMIN — BUDESONIDE AND FORMOTEROL FUMARATE DIHYDRATE 2 PUFF(S): 160; 4.5 AEROSOL RESPIRATORY (INHALATION) at 22:00

## 2022-03-15 RX ADMIN — Medication 3 MILLILITER(S): at 13:10

## 2022-03-15 RX ADMIN — Medication 3 MILLILITER(S): at 12:47

## 2022-03-15 RX ADMIN — ATORVASTATIN CALCIUM 10 MILLIGRAM(S): 80 TABLET, FILM COATED ORAL at 22:00

## 2022-03-15 RX ADMIN — Medication 125 MILLIGRAM(S): at 12:47

## 2022-03-15 RX ADMIN — Medication 30 MILLILITER(S): at 19:19

## 2022-03-15 NOTE — H&P ADULT - NSHPSOCIALHISTORY_GEN_ALL_CORE
Former smoker, 35 pack year history quit in 2020 after having stroke   Drinks one glass of wine a night   No recreational substance use   Lives at home with  and daughter, daughter does grocery shopping, but patient able to bathe, clothe, cook, and administer own medications   Patient uses walker/cane when traveling outside

## 2022-03-15 NOTE — H&P ADULT - PROBLEM SELECTOR PLAN 2
Patient has been told by outpatient cardiologist Dr. Crawford that she has "congestive systolic heart failure", has had prior TTE performed outpatient   - f/u TTE in house   - patient not very volume overloaded on exam   - continue home spironolactone Patient has been told by outpatient cardiologist Dr. Crawford that she has "congestive systolic heart failure", has had prior TTE performed outpatient   - f/u TTE in house   - patient not very volume overloaded on exam   - continue home spironolactone 25mg qD

## 2022-03-15 NOTE — ED PROVIDER NOTE - NS ED ATTENDING STATEMENT MOD
This was a shared visit with the KANE. I reviewed and verified the documentation and independently performed the documented:

## 2022-03-15 NOTE — ED ADULT NURSE NOTE - OBJECTIVE STATEMENT
Pt presents to the ED s/p difficulty breathing that has been getting worse for some time, but the last few days it has become even more difficult than before to breathing. 02 @  2 liters via n/c, non productive now that in the past was productive, greenish in color , now its whitish. Pt denies fever, chills, chest pain. EKG done, pt placed on cardiac monitor. Pt presents to the ED s/p difficulty breathing that has been getting worse for some time, but the last few days it has become even more difficult than before to breathing especially with ambulation. 02 @  2 liters via n/c, non productive now that in the past was productive, greenish in color , now its whitish. Pt denies fever, chills, chest pain. EKG done, pt placed on cardiac monitor.

## 2022-03-15 NOTE — PATIENT PROFILE ADULT - FALL HARM RISK - RISK INTERVENTIONS

## 2022-03-15 NOTE — H&P ADULT - NSICDXPASTMEDICALHX_GEN_ALL_CORE_FT
PAST MEDICAL HISTORY:  Arthritis right knee and shoulders    CVA (cerebrovascular accident) 2020    Emphysema lung COPD    Hypercholesterolemia     Hypertension     Melanoma     Miscarriage x 2

## 2022-03-15 NOTE — CONSULT NOTE ADULT - ASSESSMENT
85 yo F PMHx HTN, HLD, emphysema (not on home O2), arthritis, melanoma (s/p excision L calf) presents to ED with daughter Rosina for evaluation of progressively worsening dyspnea    pt with advanced COPD and CHF - 70 pack year smoking hx - chronic SOB and ADORNO - anxious and frustrated  will check CT chest  TTE  d dimer normal - neg for PE  I and O  monitor VS and HD and Sat  blood gas noted - normal  reassurance  anxiolytics  o2 support as needed - keep sat > 88 pct  Pulm rehab - referral will help with Dyspnea and conditioning

## 2022-03-15 NOTE — ED ADULT NURSE NOTE - NSIMPLEMENTINTERV_GEN_ALL_ED
Implemented All Fall with Harm Risk Interventions:  [place] to call system. Call bell, personal items and telephone within reach. Instruct patient to call for assistance. Room bathroom lighting operational. Non-slip footwear when patient is off stretcher. Physically safe environment: no spills, clutter or unnecessary equipment. Stretcher in lowest position, wheels locked, appropriate side rails in place. Provide visual cue, wrist band, yellow gown, etc. Monitor gait and stability. Monitor for mental status changes and reorient to person, place, and time. Review medications for side effects contributing to fall risk. Reinforce activity limits and safety measures with patient and family. Provide visual clues: red socks.

## 2022-03-15 NOTE — H&P ADULT - HISTORY OF PRESENT ILLNESS
85 yo Female w/ PMHx of HTN, HLD, emphysema (not on home O2), CHF, CVA (2020), arthritis, melanoma s/p excision 2019 presents to ED w/ increasing SOB over the past 10 days. As per patient, she has had a productive cough for the past few weeks, initially with greenish sputum but now clear. Patient denied any sick contacts or recent travel. Patient has previously refused home oxygen prior, has mostly relied on duonebs for relief of dyspneic episodes. However, patient states that in the last few days she's had to have used her duonebs almost every 4 hours every day to help her breath, and today duonebs did not relieve her SOB. Patient was brought into ED where she received duonebs, solumedrol, and started on supplemental O2. At time of examination, patient states her breathing has improved, denied any headache, lightheadedness, dizziness, CP, chest pressure, palpitations, abd pain, nausea/vomitting, fevers/chills, MSK pain. Patient endorses b/l LE swelling, chest congestion, productive cough.     In ED:   Vitals: 98.6F, HR 82, 144/58, RR 22, O2 87% RA -> 95% 2L NC  Labs significant for: , d-dimer <150, WBC 8.39, Creatinine 1.00 (baseline ~0.84-0.9)  ABG 7.35/pCO2 44/PO2 95/ hco3 24  CXR: increased pulmonary vascular congestion, grossly clear, official read pending    85 yo Female w/ PMHx of HTN, HLD, emphysema (not on home O2), CHF, CVA (2020), arthritis, melanoma s/p excision 2019 presents to ED w/ increasing SOB over the past 10 days. As per patient, she has had a productive cough for the past few weeks, initially with greenish sputum but now clear. Patient denied any sick contacts or recent travel. Patient has previously refused home oxygen prior, has mostly relied on duonebs for relief of dyspneic episodes. However, patient states that in the last few days she's had to have used her duonebs almost every 4 hours every day to help her breath, and today duonebs did not relieve her SOB. Patient was brought into ED where she received duonebs, solumedrol, and started on supplemental O2. At time of examination, patient states her breathing has improved, denied any headache, lightheadedness, dizziness, CP, chest pressure, palpitations, abd pain, nausea/vomitting, fevers/chills, MSK pain. Patient endorses b/l LE swelling, chest congestion, productive cough.     In ED:   Vitals: 98.6F, HR 82, 144/58, RR 22, O2 87% RA -> 95% 2L NC  Labs significant for: , d-dimer <150, WBC 8.39, Creatinine 1.00 (baseline ~0.84-0.9)  ABG 7.35/pCO2 44/PO2 95/ hco3 24  CXR: increased pulmonary vascular congestion, grossly clear, official read pending     EKG: NSR 78 bpm w/ sinus arryhtmia, mild LAD, QTc 440ms  87 yo Female w/ PMHx of HTN, HLD, emphysema (not on home O2), CHF, CVA (2020), arthritis, melanoma s/p excision 2019 presents to ED w/ increasing SOB over the past 10 days. As per patient, she has had a productive cough for the past few weeks, initially with greenish sputum but now clear. Patient denied any sick contacts or recent travel. Patient has previously refused home oxygen prior, has mostly relied on duonebs for relief of dyspneic episodes. However, patient states that in the last few days she's had to have used her duonebs almost every 4 hours every day to help her breath, and today duonebs did not relieve her SOB. Patient was brought into ED where she received duonebs, solumedrol, and started on supplemental O2. At time of examination, patient states her breathing has improved, denied any headache, lightheadedness, dizziness, CP, chest pressure, palpitations, abd pain, nausea/vomitting, fevers/chills, MSK pain. Patient endorses b/l LE swelling, chest congestion, productive cough.     In ED:   Vitals: 98.6F, HR 82, 144/58, RR 22, O2 87% RA -> 95% 2L NC  Labs significant for: , d-dimer <150, WBC 8.39, Creatinine 1.00 (baseline ~0.84-0.9) troponin neg x1   ABG 7.35/pCO2 44/PO2 95/ hco3 24  CXR: increased pulmonary vascular congestion, grossly clear, official read pending     EKG: NSR 78 bpm w/ sinus arryhtmia, mild LAD, QTc 440ms  87 yo Female w/ PMHx of HTN, HLD, emphysema (not on home O2), CHF [EF unknown], CVA (2020), arthritis, melanoma s/p excision 2019 presents to ED w/ increasing SOB over the past 10 days. As per patient, she has had a productive cough for the past few weeks, initially with greenish sputum but now clear. Patient denied any sick contacts or recent travel. Patient has previously refused home oxygen prior, has mostly relied on duonebs for relief of dyspneic episodes. However, patient states that in the last few days she's had to have used her duonebs almost every 4 hours every day to help her breath, and today duonebs did not relieve her SOB. Patient was brought into ED where she received duonebs, solumedrol, and started on supplemental O2. At time of examination, patient states her breathing has improved, denied any headache, lightheadedness, dizziness, CP, chest pressure, palpitations, abd pain, nausea/vomitting, fevers/chills, MSK pain. Patient endorses b/l LE swelling, chest congestion, productive cough.     In ED:   Vitals: 98.6F, HR 82, 144/58, RR 22, O2 87% RA -> 95% 2L NC  Labs significant for: , d-dimer <150, WBC 8.39, Creatinine 1.00 (baseline ~0.84-0.9) troponin neg x1   ABG 7.35/pCO2 44/PO2 95/ hco3 24  CXR: increased pulmonary vascular congestion, grossly clear, official read pending     EKG: NSR 78 bpm w/ sinus arryhtmia, mild LAD, QTc 440ms

## 2022-03-15 NOTE — H&P ADULT - PROBLEM SELECTOR PLAN 1
Acute on Chronic COPD exacerbation 2/2 to URI vs. anxiety vs. CHF   - CXR: increased pulmonary vascular congestion, grossly clear, official read pending     - f/u CT chest   - d-dimer negative, low suspicion for PE   - continue PRN duonebs   - continue supplemental O2 goal O2 sat >88%   - Pulmonology Dr. Wilkinson following, f/u recs Acute on Chronic COPD exacerbation 2/2 to URI vs. anxiety vs. CHF   - CXR: increased pulmonary vascular congestion, grossly clear, official read pending     - f/u CT chest   - d-dimer negative, low suspicion for PE   - symbicort, spiriva, albuterol prn  - xanax 0.25mg q8h PRN for anxiety  - prednisone 30mg qD  - continue supplemental O2 goal O2 sat >88%   - Pulmonology Dr. Wilkinson following, recs appreciated Acute on Chronic COPD exacerbation 2/2 to URI vs. anxiety vs. CHF   - CXR: increased pulmonary vascular congestion, grossly clear, official read pending     - f/u CT chest   - d-dimer negative, low suspicion for PE  - troponin first set negative, no need to trend further    - symbicort, spiriva, albuterol prn  - xanax 0.25mg q8h PRN for anxiety  - prednisone 30mg qD  - continue supplemental O2 goal O2 sat >88%   - Pulmonology Dr. Wilkinson following, recs appreciated Acute on Chronic COPD exacerbation 2/2 to possible URI   - CXR: increased pulmonary vascular congestion, grossly clear, official read pending     - f/u CT chest   - d-dimer negative, low suspicion for PE  - troponin first set negative, no need to trend further    - symbicort, spiriva, albuterol prn  - xanax 0.25mg q8h PRN for anxiety  - prednisone 30mg qD  - continue supplemental O2 goal O2 sat >88%   - Pulmonology Dr. Wilkinson following, recs appreciated

## 2022-03-15 NOTE — PATIENT PROFILE ADULT - NSPRESCRALCSIXMORE_GEN_A_NUR
Gregor is inpatient at [facility]-unable to attend 10/14/21 2 week POV.  Appt rescheduled for 10/28.  Writer to contact at 3 weeks PO   Less than monthly

## 2022-03-15 NOTE — H&P ADULT - NSHPREVIEWOFSYSTEMS_GEN_ALL_CORE
CONSTITUTIONAL: denies fever, chills, fatigue, weakness  HEENT: endorses productive cough, no sore throat  CARDIOVASCULAR: denies chest pain, chest pressure, palpitations  RESPIRATORY: endorses improving shortness of breath, endorses clear sputum production   GASTROINTESTINAL: denies nausea, vomiting, diarrhea, abdominal pain, endorses constipation   GENITOURINARY: denies dysuria, hematochezia, hematuria   NEUROLOGICAL: denies numbness, headache, focal weakness  MUSCULOSKELETAL: denies new joint pain, muscle aches  HEMATOLOGIC: denies gross bleeding, bruising  LYMPHATICS: denies enlarged lymph nodes, endorses extremity swelling  ENDOCRINOLOGIC: denies sweating, cold or heat intolerance CONSTITUTIONAL: denies fever, chills, fatigue, weakness  HEENT: endorses productive cough, no sore throat  CARDIOVASCULAR: denies chest pain, chest pressure, palpitations  RESPIRATORY: endorses improving shortness of breath, endorses clear sputum production   GASTROINTESTINAL: denies nausea, vomiting, diarrhea, abdominal pain, endorses constipation. denies melena and hematochezia  GENITOURINARY: denies dysuria, hematochezia, hematuria   NEUROLOGICAL: denies numbness, headache, focal weakness   MUSCULOSKELETAL: denies new joint pain, muscle aches  HEMATOLOGIC: denies gross bleeding, bruising  LYMPHATICS: denies enlarged lymph nodes, endorses extremity swelling  ENDOCRINOLOGIC: denies sweating, cold or heat intolerance

## 2022-03-15 NOTE — GOALS OF CARE CONVERSATION - ADVANCED CARE PLANNING - CONVERSATION DETAILS
Discussed with patient and patient's family about advanced cardiac life support methods in the event that patient's heart stopped beating or if patient showed inability to be able to breathe on their own. Patient was deemed cognitively sound and of right mind to make her own decisions. Explained to patient what CPR is and the risks and benefits of chest compressions and intubation. Patient verbalized understanding that without CPR/advanced cardiac life support, patient would have a fatal outcome if her heart stopped beating or if she was unable to breathe on her own. Patient also understood the risks and benefits of agreeing to such measures, such as the non-guarantee of being successfully resuscitated and possible chest/throat trauma involved with chest compressions/intubation respectively. Patient wishes to be DNR/DNI, molst form was filled out and is in patients chart.

## 2022-03-15 NOTE — H&P ADULT - PROBLEM SELECTOR PLAN 3
Chronic, stable on admission  - Continue home cardizem with hold parameters  - Monitor routine hemodynamics Patient admitted for CVA back in 2020, no residual deficits on physical exam   - Carotid dopplers performed then showed 50-69% stenosis b/l, however, as per patient outpatient repeat scan with vascular surgeon Dr. Schulte showed no blockage   - continue patients home statin   - Patient previously on aspirin, was taken off by cardiologist Dr. Crawford Patient admitted for CVA back in 2020, no residual deficits on physical exam   - Carotid dopplers performed then showed 50-69% stenosis b/l, however, as per patient outpatient repeat scan with vascular surgeon Dr. Schulte showed no blockage   - continue patients home statin   - Patient previously on aspirin, was taken off by cardiologist Dr. Crawford -f/u as outpatient

## 2022-03-15 NOTE — CONSULT NOTE ADULT - SUBJECTIVE AND OBJECTIVE BOX
Date/Time Patient Seen:  		  Referring MD:   Data Reviewed	       Patient is a 86y old  Female who presents with a chief complaint of     Subjective/HPI  in bed  seen and examined  vs noted  labs reviewed  imaging reviewed  H and P reviewed  ER provider note reviewed  cxr noted      · Chief Complaint: The patient is a 86y Female complaining of difficulty breathing.  · HPI Objective Statement: 85 yo F PMHx HTN, HLD, emphysema (not on home O2), arthritis, melanoma (s/p excision L calf) presents to ED with daughter Rosina for evaluation of progressively worsening dyspnea x months, most acutely over the last 10 days 2/2 productive cough with greenish sputum. As per daughter, pt was to be sent home on O2 after her hospital admission in 2020 but was not due to the fact that pts  was consistently smoking in the home. Pt admits to chest pressure but denies pain. Denies fever/chills, back pain, abd pain, N/V/D, leg pain.  · Presenting Symptoms: CHEST CONGESTION, DIFFICULTY BREATHING, DYSPNEA ON EXERTION, SHORTNESS OF BREATH  · Timing: gradual onset, worsening  · Duration: day(s)  · Context: coughing  · Recent Exposure To: none known  · Aggravated Factors: breathing deeply, coughing, walking  · Relieving Factors: none    PAST MEDICAL/SURGICAL/FAMILY/SOCIAL HISTORY:    Past Medical, Past Surgical, and Family History:  PAST MEDICAL HISTORY:  Arthritis right knee and shoulders    Emphysema lung COPD    Hypercholesterolemia     Hypertension     Melanoma     Miscarriage x 2.     PAST SURGICAL HISTORY:  Arthropathy left knee replacement in 2006    Bilateral cataracts with lenses in 2009    Breast cancer left  breast -- lumpectomy in 2004 -- received RT after surgery -- no chemotherapy and prev taking arimadex for 5 years.     FAMILY HISTORY:  No pertinent family history in first degree relatives.     Tobacco Usage:  · Tobacco Usage	Former smoker  · Last Tobacco Use ([id number])	01-Jun-2020    ALLERGIES AND HOME MEDICATIONS:   Allergies:        Allergies:  	No Known Allergies:        PAST MEDICAL & SURGICAL HISTORY:  Hypertension    Hypertension    Hypercholesterolemia    Emphysema lung  COPD    Melanoma    Miscarriage  x 2    Arthritis  right knee and shoulders    Breast cancer  left  breast -- lumpectomy in 2004 -- received RT after surgery -- no chemotherapy and prev taking arimadex for 5 years    Arthropathy  left knee replacement in 2006    Bilateral cataracts  with lenses in 2009          Medication list         MEDICATIONS  (STANDING):  atorvastatin 10 milliGRAM(s) Oral at bedtime    MEDICATIONS  (PRN):         Vitals log        ICU Vital Signs Last 24 Hrs  T(C): 37 (15 Mar 2022 11:57), Max: 37 (15 Mar 2022 11:57)  T(F): 98.6 (15 Mar 2022 11:57), Max: 98.6 (15 Mar 2022 11:57)  HR: 70 (15 Mar 2022 14:20) (70 - 82)  BP: 161/68 (15 Mar 2022 14:20) (144/58 - 161/68)  BP(mean): --  ABP: --  ABP(mean): --  RR: 18 (15 Mar 2022 14:20) (18 - 22)  SpO2: 98% (15 Mar 2022 14:20) (87% - 98%)           Input and Output:  I&O's Detail      Lab Data                        15.1   8.39  )-----------( 265      ( 15 Mar 2022 12:58 )             46.4     03-15    141  |  109<H>  |  21  ----------------------------<  109<H>  4.8   |  28  |  1.00    Ca    9.5      15 Mar 2022 12:58    TPro  7.1  /  Alb  3.5  /  TBili  0.4  /  DBili  x   /  AST  16  /  ALT  27  /  AlkPhos  90  03-15    ABG - ( 15 Mar 2022 14:59 )  pH, Arterial: 7.35  pH, Blood: x     /  pCO2: 44    /  pO2: 95    / HCO3: 24    / Base Excess: -1.3  /  SaO2: 97.9                    Review of Systems	  sob  mack  weakness  anxious      Objective     Physical Examination  heart s1s2  lung dec BS  abd soft  head nc  verbal  alert  anxious  on o2 support  pvd in LE      Pertinent Lab findings & Imaging      Bruno:  NO   Adequate UO     I&O's Detail           Discussed with:     Cultures:	        Radiology    ACC: [id number] EXAM:  XR CHEST PORTABLE URGENT 1V                          PROCEDURE DATE:  03/15/2022          INTERPRETATION:  Portable chest radiograph    CLINICAL INFORMATION: Dyspnea, shortness of breath.    TECHNIQUE:  Portable  AP chest radiograph.    COMPARISON: 5/7/2020 chest radiograph .PET scan 1/24/2020    FINDINGS:  CATHETERS AND TUBES: None    PULMONARY:  LEFT upper zone and LEFT mid zone linear atelectasis.  Remaining lung parenchyma grossly clear.  No pneumothorax.    HEART/VASCULAR: The heart and mediastinum size and configuration are   within normal limits.    BONES: Visualized osseous structures are intact.    IMPRESSION: LEFT upper zone and LEFT mid zone linear atelectasis.    --- End of Report ---            LUZMARIA PADRON MD; Attending Radiologist  This document has been electronically signed. Mar 15 2022  3:28PM

## 2022-03-15 NOTE — H&P ADULT - PROBLEM SELECTOR PLAN 4
Chronic  - Continue home statin Chronic, stable on admission  - Continue home cardizem 300mg qD with hold parameters  - patient previously on norvasc, was taken off by outpatient cardiologist due to patients b/l LE edema   - Monitor routine hemodynamics

## 2022-03-15 NOTE — ED PROVIDER NOTE - CLINICAL SUMMARY MEDICAL DECISION MAKING FREE TEXT BOX
Stan:  pt with acute on chronic SOB c/w copd exacerbation requiring nebs more frequently than Q4h at home, pt will need admission for acute pulmonary care

## 2022-03-15 NOTE — ED PROVIDER NOTE - OBJECTIVE STATEMENT
87 yo F PMHx HTN, HLD, emphysema (not on home O2), arthritis, melanoma (s/p excision L calf) presents to ED with daughter Rosina for evaluation of progressively worsening dyspnea x months, most acutely over the last 10 days 2/2 productive cough with greenish sputum. As per daughter, pt was to be sent home on O2 after her hospital admission in 2020 but was not due to the fact that pts  was consistently smoking in the home. Pt admits to chest pressure but denies pain. Denies fever/chills, back pain, abd pain, N/V/D, leg pain.

## 2022-03-15 NOTE — H&P ADULT - NSHPPHYSICALEXAM_GEN_ALL_CORE
T(C): 37 (03-15-22 @ 11:57), Max: 37 (03-15-22 @ 11:57)  HR: 70 (03-15-22 @ 14:20) (70 - 82)  BP: 161/68 (03-15-22 @ 14:20) (144/58 - 161/68)  RR: 18 (03-15-22 @ 14:20) (18 - 22)  SpO2: 98% (03-15-22 @ 14:20) (87% - 98%)    GENERAL: patient appears well, no acute distress, elderly female, NC in place   EYES: sclera clear, no exudates, PERLL b/l, EOMI b/l   ENMT: oropharynx clear, moist mucous membranes. poor dentition   NECK: supple, soft, no thyromegaly noted  LUNGS: poor breath sounds b/l posterior superior and inferior lung fields, CTA b/l anterior lung fields  HEART: soft S1/S2, regular rate and rhythm, no murmurs noted, no lower extremity edema  GASTROINTESTINAL: abdomen is soft, nontender, nondistended, normoactive bowel sounds, no palpable masses  MUSCULOSKELETAL: no clubbing or cyanosis, no obvious deformity, b/l LE mild non-pitting edema, b/l shin erythema with dry skin R>L   NEUROLOGIC: awake, alert, oriented x3, good muscle tone in 4 extremities, CN II-XII intact, no obvious sensory deficits  PSYCHIATRIC: mood is good, affect is congruent, linear and logical thought process  HEME/LYMPH: no palpable supraclavicular nodules, no obvious ecchymosis or petechiae T(C): 37 (03-15-22 @ 11:57), Max: 37 (03-15-22 @ 11:57)  HR: 70 (03-15-22 @ 14:20) (70 - 82)  BP: 161/68 (03-15-22 @ 14:20) (144/58 - 161/68)  RR: 18 (03-15-22 @ 14:20) (18 - 22)  SpO2: 98% (03-15-22 @ 14:20) (87% - 98%)    GENERAL: patient appears well, no acute distress, elderly female, NC in place   EYES: sclera clear, no exudates, PERLL b/l, EOMI b/l   ENMT: oropharynx clear, moist mucous membranes. poor dentition   NECK: supple, soft, no thyromegaly noted, JVD not visualized   LUNGS: poor breath sounds b/l posterior superior and inferior lung fields, CTA b/l anterior lung fields  HEART: soft S1/S2, regular rate and rhythm, no murmurs noted, no lower extremity edema  GASTROINTESTINAL: abdomen is soft, nontender, nondistended, normoactive bowel sounds, no palpable masses  MUSCULOSKELETAL: no clubbing or cyanosis, no obvious deformity, b/l LE mild non-pitting edema, b/l shin erythema with dry skin R>L   NEUROLOGIC: awake, alert, oriented x3, good muscle tone in 4 extremities, CN II-XII intact, no obvious sensory deficits  PSYCHIATRIC: mood is good, affect is congruent, linear and logical thought process  HEME/LYMPH: no palpable supraclavicular nodules, no obvious ecchymosis or petechiae T(C): 37 (03-15-22 @ 11:57), Max: 37 (03-15-22 @ 11:57)  HR: 70 (03-15-22 @ 14:20) (70 - 82)  BP: 161/68 (03-15-22 @ 14:20) (144/58 - 161/68)  RR: 18 (03-15-22 @ 14:20) (18 - 22)  SpO2: 98% (03-15-22 @ 14:20) (87% - 98%)    GENERAL: patient appears well, no acute distress, elderly female, NC in place   EYES: sclera clear, no exudates, PERLL b/l, EOMI b/l   ENMT: oropharynx clear, moist mucous membranes. poor dentition   NECK: supple, soft, no thyromegaly noted, JVD not visualized   LUNGS: poor breath sounds b/l posterior superior and inferior lung fields, CTA b/l anterior lung fields   HEART: soft S1/S2, regular rate and rhythm, no murmurs noted, no lower extremity edema  GASTROINTESTINAL: abdomen is soft, nontender, nondistended, normoactive bowel sounds, no palpable masses  MUSCULOSKELETAL: no clubbing or cyanosis, no obvious deformity, b/l LE mild pitting edema, b/l shin erythema with dry skin R>L   NEUROLOGIC: awake, alert, oriented x3, good muscle tone in 4 extremities, CN II-XII intact, no obvious sensory deficits  PSYCHIATRIC: mood is good, affect is congruent, linear and logical thought process  HEME/LYMPH: no palpable supraclavicular nodules, no obvious ecchymosis or petechiae

## 2022-03-15 NOTE — H&P ADULT - ATTENDING COMMENTS
87 yo Female w/ PMHx of HTN, HLD, emphysema (not on home O2), CHF, CVA (2020), arthritis, melanoma s/p excision 2019 presents to ED w/ increasing SOB over the past 10 days, admitted for acute COPD exacerbation most likely 2/2 to URI/mucus plugging.     HPI as above.   Patient seen and examined at bedside. States her SOB is improved with oxygen. Denies any associated chest pain, palpitations, chest pressure.   remainder HPI as above.         Plan:   COPD exacerbation: transition to PO prednisone.   -monitor pulse ox  -continue duonebs  -will obtain TTE given hx of CHF. Does not appear volume overloaded on exam.   -patient was following with pulm Dr Drake as an outpatient    dvt ppx: lovenox 85 yo Female w/ PMHx of HTN, HLD, emphysema (not on home O2), CHF, CVA (2020), arthritis, melanoma s/p excision 2019 presents to ED w/ increasing SOB over the past 10 days, admitted for acute COPD exacerbation most likely 2/2 to URI/mucus plugging.     HPI as above.   Patient seen and examined at bedside. States her SOB is improved with oxygen. Denies any associated chest pain, palpitations, chest pressure.   remainder HPI as above.     GENERAL: patient appears well, no acute distress, normocephalic  EYES: sclera clear, no exudates  ENMT: oropharynx clear, moist mucous membranes. poor dentition   NECK: supple, soft, no thyromegaly noted, JVD not visualized   LUNGS: decreased breath sounds b/l, wheezing at lung bases  HEART: soft S1/S2, regular rate and rhythm, + DUNIA RUSB no radiation to carotids grade 2/6  GASTROINTESTINAL: abdomen is soft, nontender, nondistended, normoactive bowel sounds, no palpable masses  MUSCULOSKELETAL: trace LE pitting edema b/l  NEUROLOGIC: awake, alert, oriented x3, good muscle tone in 4 extremities, CN II-XII intact, no obvious sensory deficits  PSYCHIATRIC: mood is good, affect is congruent, linear and logical thought process  HEME/LYMPH: no palpable supraclavicular nodules, no obvious ecchymosis or petechiae      Plan:   COPD exacerbation: transition to PO prednisone.   -monitor pulse ox  -continue duonebs  -will obtain TTE given hx of CHF. Does not appear volume overloaded on exam.   -patient was following with pulm Dr Drake as an outpatient  -has a hx of known aortic stenosis. f/u TTE  dvt ppx: lovenox

## 2022-03-15 NOTE — H&P ADULT - ASSESSMENT
85 yo Female w/ PMHx of HTN, HLD, emphysema (not on home O2), CHF, CVA (2020), arthritis, melanoma s/p excision 2019 presents to ED w/ increasing SOB over the past 10 days, admitted for acute COPD exacerbation most likely 2/2 to URI infection.  85 yo Female w/ PMHx of HTN, HLD, emphysema (not on home O2), CHF, CVA (2020), arthritis, melanoma s/p excision 2019 presents to ED w/ increasing SOB over the past 10 days, admitted for acute COPD exacerbation most likely 2/2 to URI/mucus plugging

## 2022-03-16 LAB
ALBUMIN SERPL ELPH-MCNC: 3.2 G/DL — LOW (ref 3.3–5)
ALP SERPL-CCNC: 80 U/L — SIGNIFICANT CHANGE UP (ref 40–120)
ALT FLD-CCNC: 23 U/L — SIGNIFICANT CHANGE UP (ref 12–78)
ANION GAP SERPL CALC-SCNC: 5 MMOL/L — SIGNIFICANT CHANGE UP (ref 5–17)
AST SERPL-CCNC: 11 U/L — LOW (ref 15–37)
BASOPHILS # BLD AUTO: 0.02 K/UL — SIGNIFICANT CHANGE UP (ref 0–0.2)
BASOPHILS NFR BLD AUTO: 0.1 % — SIGNIFICANT CHANGE UP (ref 0–2)
BILIRUB SERPL-MCNC: 0.3 MG/DL — SIGNIFICANT CHANGE UP (ref 0.2–1.2)
BUN SERPL-MCNC: 34 MG/DL — HIGH (ref 7–23)
CALCIUM SERPL-MCNC: 9.3 MG/DL — SIGNIFICANT CHANGE UP (ref 8.5–10.1)
CHLORIDE SERPL-SCNC: 109 MMOL/L — HIGH (ref 96–108)
CO2 SERPL-SCNC: 27 MMOL/L — SIGNIFICANT CHANGE UP (ref 22–31)
CREAT SERPL-MCNC: 1.3 MG/DL — SIGNIFICANT CHANGE UP (ref 0.5–1.3)
CRP SERPL-MCNC: <3 MG/L — SIGNIFICANT CHANGE UP
EGFR: 40 ML/MIN/1.73M2 — LOW
EOSINOPHIL # BLD AUTO: 0 K/UL — SIGNIFICANT CHANGE UP (ref 0–0.5)
EOSINOPHIL NFR BLD AUTO: 0 % — SIGNIFICANT CHANGE UP (ref 0–6)
GLUCOSE SERPL-MCNC: 162 MG/DL — HIGH (ref 70–99)
HCT VFR BLD CALC: 43.2 % — SIGNIFICANT CHANGE UP (ref 34.5–45)
HGB BLD-MCNC: 14.1 G/DL — SIGNIFICANT CHANGE UP (ref 11.5–15.5)
IMM GRANULOCYTES NFR BLD AUTO: 0.6 % — SIGNIFICANT CHANGE UP (ref 0–1.5)
LYMPHOCYTES # BLD AUTO: 1.11 K/UL — SIGNIFICANT CHANGE UP (ref 1–3.3)
LYMPHOCYTES # BLD AUTO: 7 % — LOW (ref 13–44)
MCHC RBC-ENTMCNC: 31.8 PG — SIGNIFICANT CHANGE UP (ref 27–34)
MCHC RBC-ENTMCNC: 32.6 GM/DL — SIGNIFICANT CHANGE UP (ref 32–36)
MCV RBC AUTO: 97.5 FL — SIGNIFICANT CHANGE UP (ref 80–100)
MONOCYTES # BLD AUTO: 0.18 K/UL — SIGNIFICANT CHANGE UP (ref 0–0.9)
MONOCYTES NFR BLD AUTO: 1.1 % — LOW (ref 2–14)
NEUTROPHILS # BLD AUTO: 14.49 K/UL — HIGH (ref 1.8–7.4)
NEUTROPHILS NFR BLD AUTO: 91.2 % — HIGH (ref 43–77)
NRBC # BLD: 0 /100 WBCS — SIGNIFICANT CHANGE UP (ref 0–0)
PLATELET # BLD AUTO: 239 K/UL — SIGNIFICANT CHANGE UP (ref 150–400)
POTASSIUM SERPL-MCNC: 4.8 MMOL/L — SIGNIFICANT CHANGE UP (ref 3.5–5.3)
POTASSIUM SERPL-SCNC: 4.8 MMOL/L — SIGNIFICANT CHANGE UP (ref 3.5–5.3)
PROT SERPL-MCNC: 6.6 G/DL — SIGNIFICANT CHANGE UP (ref 6–8.3)
RBC # BLD: 4.43 M/UL — SIGNIFICANT CHANGE UP (ref 3.8–5.2)
RBC # FLD: 13 % — SIGNIFICANT CHANGE UP (ref 10.3–14.5)
SODIUM SERPL-SCNC: 141 MMOL/L — SIGNIFICANT CHANGE UP (ref 135–145)
WBC # BLD: 15.89 K/UL — HIGH (ref 3.8–10.5)
WBC # FLD AUTO: 15.89 K/UL — HIGH (ref 3.8–10.5)

## 2022-03-16 PROCEDURE — 99233 SBSQ HOSP IP/OBS HIGH 50: CPT | Mod: GC

## 2022-03-16 PROCEDURE — 93306 TTE W/DOPPLER COMPLETE: CPT | Mod: 26

## 2022-03-16 RX ADMIN — Medication 30 MILLIGRAM(S): at 05:32

## 2022-03-16 RX ADMIN — Medication 300 MILLIGRAM(S): at 05:32

## 2022-03-16 RX ADMIN — SPIRONOLACTONE 25 MILLIGRAM(S): 25 TABLET, FILM COATED ORAL at 05:34

## 2022-03-16 RX ADMIN — BUDESONIDE AND FORMOTEROL FUMARATE DIHYDRATE 2 PUFF(S): 160; 4.5 AEROSOL RESPIRATORY (INHALATION) at 17:14

## 2022-03-16 RX ADMIN — TIOTROPIUM BROMIDE 1 CAPSULE(S): 18 CAPSULE ORAL; RESPIRATORY (INHALATION) at 05:33

## 2022-03-16 RX ADMIN — BUDESONIDE AND FORMOTEROL FUMARATE DIHYDRATE 2 PUFF(S): 160; 4.5 AEROSOL RESPIRATORY (INHALATION) at 05:33

## 2022-03-16 RX ADMIN — ENOXAPARIN SODIUM 40 MILLIGRAM(S): 100 INJECTION SUBCUTANEOUS at 21:13

## 2022-03-16 RX ADMIN — ATORVASTATIN CALCIUM 10 MILLIGRAM(S): 80 TABLET, FILM COATED ORAL at 21:13

## 2022-03-16 NOTE — PROGRESS NOTE ADULT - ATTENDING COMMENTS
pt seen and examine  today see above  -87 yo Female w/ PMHx of HTN, HLD, emphysema (not on home O2), CHF, CVA (2020), arthritis, melanoma s/p excision 2019 presents to ED w/ increasing SOB over the past 10 days, admitted for acute COPD exacerbation most likely 2/2 to URI/mucus plugging.-  acute on  chronic COPD exacerbation, transition to PO prednisone -monitor pulse ox room air rest and ambulation  and continue duo nebs- -patient was following with pulm Dr Drake as an outpatient   -has a hx of known aortic stenosis f/u TTE   as hx chronic chf   unknown ef , no appear volume overloaded on exam.    - pt evaluation , daughter guillermo  on phn aware tt plan . pt seen and examine  today see above  -87 yo Female w/ PMHx of HTN, HLD, emphysema (not on home O2), CHF, CVA (2020), arthritis, melanoma s/p excision 2019 presents to ED w/ increasing SOB over the past 10 days, admitted for acute COPD exacerbation most likely 2/2 to URI/mucus plugging.-  acute on  chronic COPD exacerbation, transition to PO prednisone -monitor pulse ox room air rest and ambulation  and continue duo nebs- -patient was following with pulm Dr Drake as an outpatient   -has a hx of known aortic stenosis f/u TTE   as hx chronic chf   unknown ef , no appear volume overloaded on exam.    - pt evaluation , daughter guillermo  on phn aware tt plan  also aware Increased size of previously seen peripheral right middle lobe nodule need fu up closely  in future   .

## 2022-03-16 NOTE — PHYSICAL THERAPY INITIAL EVALUATION ADULT - PERTINENT HX OF CURRENT PROBLEM, REHAB EVAL
87 yo F PMHx HTN, HLD, emphysema (not on home O2), arthritis, melanoma (s/p excision L calf) presents to ED for evaluation of progressively worsening dyspnea x months, most acutely over the last 10 days 2/2 productive cough with greenish sputum. Pt was to be sent home on O2 after her hospital admission in 2020. Pt admits to chest pressure but denies pain.

## 2022-03-16 NOTE — PROGRESS NOTE ADULT - PROBLEM SELECTOR PLAN 1
Acute on Chronic COPD exacerbation 2/2 to possible URI   - CXR: increased pulmonary vascular congestion, grossly clear, official read pending     -  CT chest - Right upper  lobe ground-glass density is again seen. Borders   are less clear than on the prior study but does not overall appear   increased in size.  Increased size of previously seen peripheral right middle lobe nodule.  - d-dimer negative, low suspicion for PE  - troponin first set negative, no need to trend further    - symbicort, spiriva, albuterol prn  - xanax 0.25mg q8h PRN for anxiety  - prednisone 30mg qD  - continue supplemental O2 goal O2 sat >88%   - Pulmonology Dr. Wilkinson following, Acute on Chronic COPD exacerbation 2/2 to possible URI   - CXR: increased pulmonary vascular congestion, grossly clear, official read pending     -  CT chest - Right upper  lobe ground-glass density is again seen. Borders   are less clear than on the prior study but does not overall appear   increased in size.  Increased size of previously seen peripheral right middle lobe nodule no sign / symptom of pna   - d-dimer negative, low suspicion for PE  - troponin first set negative, no need to trend further    - symbicort, spiriva, albuterol prn  - xanax 0.25mg q8h PRN for anxiety  - prednisone 30mg qD  - continue supplemental O2 goal O2 sat >88%   - Pulmonology Dr. Wilkinson following,

## 2022-03-17 ENCOUNTER — TRANSCRIPTION ENCOUNTER (OUTPATIENT)
Age: 87
End: 2022-03-17

## 2022-03-17 LAB
ANION GAP SERPL CALC-SCNC: 5 MMOL/L — SIGNIFICANT CHANGE UP (ref 5–17)
BASOPHILS # BLD AUTO: 0.05 K/UL — SIGNIFICANT CHANGE UP (ref 0–0.2)
BASOPHILS NFR BLD AUTO: 0.3 % — SIGNIFICANT CHANGE UP (ref 0–2)
BUN SERPL-MCNC: 43 MG/DL — HIGH (ref 7–23)
CALCIUM SERPL-MCNC: 9.7 MG/DL — SIGNIFICANT CHANGE UP (ref 8.5–10.1)
CHLORIDE SERPL-SCNC: 109 MMOL/L — HIGH (ref 96–108)
CO2 SERPL-SCNC: 25 MMOL/L — SIGNIFICANT CHANGE UP (ref 22–31)
CREAT SERPL-MCNC: 1.1 MG/DL — SIGNIFICANT CHANGE UP (ref 0.5–1.3)
EGFR: 49 ML/MIN/1.73M2 — LOW
EOSINOPHIL # BLD AUTO: 0.01 K/UL — SIGNIFICANT CHANGE UP (ref 0–0.5)
EOSINOPHIL NFR BLD AUTO: 0.1 % — SIGNIFICANT CHANGE UP (ref 0–6)
GLUCOSE SERPL-MCNC: 130 MG/DL — HIGH (ref 70–99)
HCT VFR BLD CALC: 42.5 % — SIGNIFICANT CHANGE UP (ref 34.5–45)
HGB BLD-MCNC: 13.9 G/DL — SIGNIFICANT CHANGE UP (ref 11.5–15.5)
IMM GRANULOCYTES NFR BLD AUTO: 0.5 % — SIGNIFICANT CHANGE UP (ref 0–1.5)
LYMPHOCYTES # BLD AUTO: 1.42 K/UL — SIGNIFICANT CHANGE UP (ref 1–3.3)
LYMPHOCYTES # BLD AUTO: 7.4 % — LOW (ref 13–44)
MCHC RBC-ENTMCNC: 32.3 PG — SIGNIFICANT CHANGE UP (ref 27–34)
MCHC RBC-ENTMCNC: 32.7 GM/DL — SIGNIFICANT CHANGE UP (ref 32–36)
MCV RBC AUTO: 98.6 FL — SIGNIFICANT CHANGE UP (ref 80–100)
MONOCYTES # BLD AUTO: 0.77 K/UL — SIGNIFICANT CHANGE UP (ref 0–0.9)
MONOCYTES NFR BLD AUTO: 4 % — SIGNIFICANT CHANGE UP (ref 2–14)
NEUTROPHILS # BLD AUTO: 16.76 K/UL — HIGH (ref 1.8–7.4)
NEUTROPHILS NFR BLD AUTO: 87.7 % — HIGH (ref 43–77)
NRBC # BLD: 0 /100 WBCS — SIGNIFICANT CHANGE UP (ref 0–0)
PLATELET # BLD AUTO: 255 K/UL — SIGNIFICANT CHANGE UP (ref 150–400)
POTASSIUM SERPL-MCNC: 5.3 MMOL/L — SIGNIFICANT CHANGE UP (ref 3.5–5.3)
POTASSIUM SERPL-SCNC: 5.3 MMOL/L — SIGNIFICANT CHANGE UP (ref 3.5–5.3)
RBC # BLD: 4.31 M/UL — SIGNIFICANT CHANGE UP (ref 3.8–5.2)
RBC # FLD: 13.1 % — SIGNIFICANT CHANGE UP (ref 10.3–14.5)
SODIUM SERPL-SCNC: 139 MMOL/L — SIGNIFICANT CHANGE UP (ref 135–145)
WBC # BLD: 19.1 K/UL — HIGH (ref 3.8–10.5)
WBC # FLD AUTO: 19.1 K/UL — HIGH (ref 3.8–10.5)

## 2022-03-17 PROCEDURE — 99233 SBSQ HOSP IP/OBS HIGH 50: CPT | Mod: GC

## 2022-03-17 RX ADMIN — Medication 300 MILLIGRAM(S): at 05:32

## 2022-03-17 RX ADMIN — ATORVASTATIN CALCIUM 10 MILLIGRAM(S): 80 TABLET, FILM COATED ORAL at 21:17

## 2022-03-17 RX ADMIN — TIOTROPIUM BROMIDE 1 CAPSULE(S): 18 CAPSULE ORAL; RESPIRATORY (INHALATION) at 05:32

## 2022-03-17 RX ADMIN — ENOXAPARIN SODIUM 40 MILLIGRAM(S): 100 INJECTION SUBCUTANEOUS at 21:17

## 2022-03-17 RX ADMIN — SPIRONOLACTONE 25 MILLIGRAM(S): 25 TABLET, FILM COATED ORAL at 05:32

## 2022-03-17 RX ADMIN — BUDESONIDE AND FORMOTEROL FUMARATE DIHYDRATE 2 PUFF(S): 160; 4.5 AEROSOL RESPIRATORY (INHALATION) at 05:33

## 2022-03-17 RX ADMIN — BUDESONIDE AND FORMOTEROL FUMARATE DIHYDRATE 2 PUFF(S): 160; 4.5 AEROSOL RESPIRATORY (INHALATION) at 17:15

## 2022-03-17 RX ADMIN — Medication 30 MILLIGRAM(S): at 05:32

## 2022-03-17 NOTE — DISCHARGE NOTE NURSING/CASE MANAGEMENT/SOCIAL WORK - NSDCPEFALRISK_GEN_ALL_CORE
For information on Fall & Injury Prevention, visit: [email or web address] OR  [email or web address] OR  https://www.cdc.gov/steadi/patient.html

## 2022-03-17 NOTE — PROGRESS NOTE ADULT - PROBLEM SELECTOR PLAN 1
Acute on Chronic COPD exacerbation 2/2 to possible URI  cause acute hypoxic resp failure poa   - CXR: increased pulmonary vascular congestion, grossly clear, official read pending     -  CT chest - Right upper  lobe ground-glass density is again seen. Borders   are less clear than on the prior study but does not overall appear   increased in size.  Increased size of previously seen peripheral right middle lobe nodule no sign / symptom of pna   - d-dimer negative, low suspicion for PE  - troponin first set negative, no need to trend further    - symbicort, spiriva, albuterol prn  - xanax 0.25mg q8h PRN for anxiety  - prednisone 30mg qD taper slowly   - continue supplemental O2 goal O2 sat >88%    - Pulmonology Dr. Wilkinson following, need home 02 at dc  / ambulatory room air 81.

## 2022-03-17 NOTE — SBIRT NOTE ADULT - NSSBIRTALCTYPDAY_GEN_A_CORE
Pt last visit (initial visit) was June 2020 with Dr. Efrain Mensah for perineum pain/scar tissue.    Had PT for scar & dyspareunia  H/o 4th degree with perineal pain  Diagnosed:   N94.10) Female dyspareunia  (primary encounter diagnosis)   (N81.84) Pelvic muscl 1 or 2

## 2022-03-17 NOTE — PROGRESS NOTE ADULT - PROBLEM SELECTOR PLAN 2
Patient has been told by outpatient cardiologist Dr. Crawford that she has "congestive systolic heart failure", has had prior TTE performed outpatient - f/u TTE   - patient not very volume overloaded on exam   - continue home spironolactone 25mg qD
Patient has been told by outpatient cardiologist Dr. Crawford that she has "congestive systolic heart failure", has had prior TTE performed outpatient - f/u TTE   - patient not very volume overloaded on exam   - continue home spironolactone 25mg qD

## 2022-03-17 NOTE — PROGRESS NOTE ADULT - PROBLEM SELECTOR PLAN 4
Chronic, stable on admission  - Continue home Cardizem 300mg qD with hold parameters  - patient previously on norvasc, was taken off by outpatient cardiologist due to patients b/l LE edema
Chronic, stable on admission  - Continue home Cardizem 300mg qD with hold parameters  - patient previously on norvasc, was taken off by outpatient cardiologist due to patients b/l LE edema

## 2022-03-17 NOTE — PROGRESS NOTE ADULT - ATTENDING COMMENTS
pt seen and examine  today see above  -87 yo Female w/ PMHx of HTN, HLD, emphysema (not on home O2), CHF, CVA (2020), arthritis, melanoma s/p excision 2019 presents to ED w/ increasing SOB over the past 10 days, admitted for acute COPD exacerbation most likely 2/2 to URI/mucus plugging.-  acute on  chronic COPD exacerbation  cause of acute hypoxic resp failure   -  continue  on neb  tt , po prednisone taper   pulse ox room air rest  90 and ambulation  81  so will  need home  o2  at dc  3lit nc  keep sat  90 to 92   - -patient was following with pulm Dr Drake as an outpatient -has a hx of known aortic stenosis f/u TTE   as hx chronic chf   unknown ef , no appear volume overloaded on exam.    - pt evaluation / home pt  home 02 arrangement ,  daughter guillermo   also aware Increased size of previously seen peripheral right middle lobe nodule need fu up closely  in future .  dc planning with home 02 arrangement.

## 2022-03-17 NOTE — DISCHARGE NOTE NURSING/CASE MANAGEMENT/SOCIAL WORK - PATIENT PORTAL LINK FT
You can access the FollowMyHealth Patient Portal offered by [facility] by registering at the following website: [email or web address] By joining [email or web address] FollowMyHealth portal, you will also be able to view your health information using other applications (apps) compatible with our system.

## 2022-03-17 NOTE — PROGRESS NOTE ADULT - PROBLEM SELECTOR PLAN 3
Patient admitted for CVA back in 2020, no residual deficits on physical exam   - Carotid dopplers performed then showed 50-69% stenosis b/l, however, as per patient outpatient repeat scan with vascular surgeon Dr. Schulte showed no blockage   - continue patients home statin   - Patient previously on aspirin, was taken off by cardiologist Dr. Crawford -f/u as outpatient
Patient admitted for CVA back in 2020, no residual deficits on physical exam   - Carotid dopplers performed then showed 50-69% stenosis b/l, however, as per patient outpatient repeat scan with vascular surgeon Dr. Schulte showed no blockage   - continue patients home statin   - Patient previously on aspirin, was taken off by cardiologist Dr. Crawford -f/u as outpatient

## 2022-03-18 ENCOUNTER — TRANSCRIPTION ENCOUNTER (OUTPATIENT)
Age: 87
End: 2022-03-18

## 2022-03-18 VITALS
TEMPERATURE: 98 F | RESPIRATION RATE: 17 BRPM | HEART RATE: 64 BPM | SYSTOLIC BLOOD PRESSURE: 121 MMHG | DIASTOLIC BLOOD PRESSURE: 61 MMHG | OXYGEN SATURATION: 95 %

## 2022-03-18 LAB
ANION GAP SERPL CALC-SCNC: 5 MMOL/L — SIGNIFICANT CHANGE UP (ref 5–17)
BUN SERPL-MCNC: 44 MG/DL — HIGH (ref 7–23)
CALCIUM SERPL-MCNC: 9.3 MG/DL — SIGNIFICANT CHANGE UP (ref 8.5–10.1)
CHLORIDE SERPL-SCNC: 110 MMOL/L — HIGH (ref 96–108)
CO2 SERPL-SCNC: 26 MMOL/L — SIGNIFICANT CHANGE UP (ref 22–31)
CREAT SERPL-MCNC: 1.2 MG/DL — SIGNIFICANT CHANGE UP (ref 0.5–1.3)
EGFR: 44 ML/MIN/1.73M2 — LOW
GLUCOSE SERPL-MCNC: 100 MG/DL — HIGH (ref 70–99)
POTASSIUM SERPL-MCNC: 5.2 MMOL/L — SIGNIFICANT CHANGE UP (ref 3.5–5.3)
POTASSIUM SERPL-SCNC: 5.2 MMOL/L — SIGNIFICANT CHANGE UP (ref 3.5–5.3)
SODIUM SERPL-SCNC: 141 MMOL/L — SIGNIFICANT CHANGE UP (ref 135–145)

## 2022-03-18 PROCEDURE — 80048 BASIC METABOLIC PNL TOTAL CA: CPT

## 2022-03-18 PROCEDURE — 97116 GAIT TRAINING THERAPY: CPT

## 2022-03-18 PROCEDURE — 99285 EMERGENCY DEPT VISIT HI MDM: CPT

## 2022-03-18 PROCEDURE — 97162 PT EVAL MOD COMPLEX 30 MIN: CPT

## 2022-03-18 PROCEDURE — 84484 ASSAY OF TROPONIN QUANT: CPT

## 2022-03-18 PROCEDURE — 94640 AIRWAY INHALATION TREATMENT: CPT

## 2022-03-18 PROCEDURE — 36415 COLL VENOUS BLD VENIPUNCTURE: CPT

## 2022-03-18 PROCEDURE — 99239 HOSP IP/OBS DSCHRG MGMT >30: CPT | Mod: GC

## 2022-03-18 PROCEDURE — 80053 COMPREHEN METABOLIC PANEL: CPT

## 2022-03-18 PROCEDURE — 97110 THERAPEUTIC EXERCISES: CPT

## 2022-03-18 PROCEDURE — 86140 C-REACTIVE PROTEIN: CPT

## 2022-03-18 PROCEDURE — 82803 BLOOD GASES ANY COMBINATION: CPT

## 2022-03-18 PROCEDURE — 71250 CT THORAX DX C-: CPT

## 2022-03-18 PROCEDURE — 71045 X-RAY EXAM CHEST 1 VIEW: CPT

## 2022-03-18 PROCEDURE — 93005 ELECTROCARDIOGRAM TRACING: CPT

## 2022-03-18 PROCEDURE — C8929: CPT

## 2022-03-18 PROCEDURE — 83605 ASSAY OF LACTIC ACID: CPT

## 2022-03-18 PROCEDURE — 96374 THER/PROPH/DIAG INJ IV PUSH: CPT

## 2022-03-18 PROCEDURE — 85379 FIBRIN DEGRADATION QUANT: CPT

## 2022-03-18 PROCEDURE — 0225U NFCT DS DNA&RNA 21 SARSCOV2: CPT

## 2022-03-18 PROCEDURE — 85025 COMPLETE CBC W/AUTO DIFF WBC: CPT

## 2022-03-18 PROCEDURE — 97112 NEUROMUSCULAR REEDUCATION: CPT

## 2022-03-18 PROCEDURE — 83880 ASSAY OF NATRIURETIC PEPTIDE: CPT

## 2022-03-18 RX ORDER — TIOTROPIUM BROMIDE 18 UG/1
1 CAPSULE ORAL; RESPIRATORY (INHALATION)
Qty: 30 | Refills: 0
Start: 2022-03-18 | End: 2022-04-16

## 2022-03-18 RX ORDER — BUDESONIDE AND FORMOTEROL FUMARATE DIHYDRATE 160; 4.5 UG/1; UG/1
1 AEROSOL RESPIRATORY (INHALATION)
Qty: 1 | Refills: 0
Start: 2022-03-18 | End: 2022-04-16

## 2022-03-18 RX ORDER — ALBUTEROL 90 UG/1
2 AEROSOL, METERED ORAL
Qty: 1 | Refills: 0
Start: 2022-03-18 | End: 2022-04-16

## 2022-03-18 RX ORDER — ALBUTEROL 90 UG/1
3 AEROSOL, METERED ORAL
Qty: 0 | Refills: 0 | DISCHARGE

## 2022-03-18 RX ORDER — TIOTROPIUM BROMIDE 18 UG/1
1 CAPSULE ORAL; RESPIRATORY (INHALATION)
Qty: 0 | Refills: 0 | DISCHARGE
Start: 2022-03-18

## 2022-03-18 RX ORDER — ALBUTEROL 90 UG/1
2 AEROSOL, METERED ORAL
Qty: 0 | Refills: 0 | DISCHARGE
Start: 2022-03-18

## 2022-03-18 RX ADMIN — SPIRONOLACTONE 25 MILLIGRAM(S): 25 TABLET, FILM COATED ORAL at 05:15

## 2022-03-18 RX ADMIN — TIOTROPIUM BROMIDE 1 CAPSULE(S): 18 CAPSULE ORAL; RESPIRATORY (INHALATION) at 05:17

## 2022-03-18 RX ADMIN — BUDESONIDE AND FORMOTEROL FUMARATE DIHYDRATE 2 PUFF(S): 160; 4.5 AEROSOL RESPIRATORY (INHALATION) at 05:15

## 2022-03-18 RX ADMIN — Medication 30 MILLIGRAM(S): at 05:15

## 2022-03-18 RX ADMIN — Medication 300 MILLIGRAM(S): at 05:15

## 2022-03-18 NOTE — PHARMACOTHERAPY INTERVENTION NOTE - COMMENTS
COPD Patient Transitions of Care Counseling  Counseled by ([facility] name): Yuri Booker Pharm. D.   Person(s) counseled: Patient, at bedside  Counseling materials provided/counseling aids used:  American [employer] Association Action plan, Micromedex [place]  Time spent Counselin minutes  Counseling provided according to ASHP guidelines including side effects  Notes:  Demonstrated inhalers, patient will work with her Pulmonologist regarding if rx's not covered by insurance after discharge.

## 2022-03-18 NOTE — DISCHARGE NOTE PROVIDER - NSDCCPCAREPLAN_GEN_ALL_CORE_FT
PRINCIPAL DISCHARGE DIAGNOSIS  Diagnosis: COPD exacerbation  Assessment and Plan of Treatment: you were treated with  nebulizer and prednison - now going home with home 02 arrengment .      SECONDARY DISCHARGE DIAGNOSES  Diagnosis: Chronic CHF  Assessment and Plan of Treatment: - continue home meds    Diagnosis: HLD (hyperlipidemia)  Assessment and Plan of Treatment: continue home meds    Diagnosis: HTN (hypertension)  Assessment and Plan of Treatment: continue home meds - rob Albright

## 2022-03-18 NOTE — DISCHARGE NOTE PROVIDER - NSDCMRMEDTOKEN_GEN_ALL_CORE_FT
albuterol 90 mcg/inh inhalation aerosol: 2 puff(s) inhaled every 6 hours, As needed, Shortness of Breath and/or Wheezing  atorvastatin 10 mg oral tablet: 1 tab(s) orally once a day  budesonide-formoterol 80 mcg-4.5 mcg/inh inhalation aerosol: 1 puff(s) inhaled 2 times a day   DilTIAZem (Eqv-Cardizem CD) 300 mg/24 hours oral capsule, extended release: 1 cap(s) orally once a day  predniSONE 10 mg oral tablet: 3 tab(s) orally once a day  spironolactone 25 mg oral tablet: 1 tab(s) orally once a day  tiotropium 18 mcg inhalation capsule: 1 cap(s) inhaled once a day  Vitamin D3: 1 tab(s) orally once a day

## 2022-03-18 NOTE — DISCHARGE NOTE PROVIDER - HOSPITAL COURSE
87 yo Female w/ PMHx of HTN, HLD, emphysema (not on home O2), CHF, CVA (2020), arthritis, melanoma s/p excision 2019 presents to ED w/ increasing SOB over the past 10 days, admitted for acute COPD exacerbation most likely 2/2 to URI/mucus plugging COPD exacerbation - : Acute on Chronic COPD exacerbation 2/2 to possible URI  cause acute hypoxic resp failure poa   - CXR: increased pulmonary vascular congestion, grossly clear, official read pending       and  CT chest - Right upper  lobe ground-glass density is again seen. Borders are less clear than on the prior study but does not overall appear   increased in size.   and increased size of previously seen peripheral right middle lobe nodule no sign / symptom of pna   , d-dimer negative, low suspicion for PE  - troponin first set negative, no need to trend further    symbicort, spiriva, albuterol prn  and  xanax 0.25mg q8h PRN for anxiety ,  prednisone 30mg qD taper slowly total 5 days.    and continue supplemental O2 goal O2 sat >88%    - Pulmonology Dr. Wilkinson following, need home 02 at dc  / ambulatory room air 81   on 3lit 93   pt later start feeling better     , Chronic CHF Patient has been told by outpatient cardiologist Dr. Crawford that she has "congestive systolic heart failure", has had prior TTE performed outpatient -  TTE   - Normal left ventricular internal dimensions and systolic function,   estimated LVEF of 60-65%.  The right ventricle is not well-visualized  Aortic valve is not well-visualized. Overall appears calcified  Mild MR patient not very volume overloaded on exam  - continue home spironolactone 25mg qD.   clear by pulmonary dr wilkinson  dc and fu up f/u with Dr Chester Pulm  Pulm rehab referral from Dr Chester  medically stable day of dc daughter  aware with dc plan   Vital Signs Last 24 Hrs  T(C): 36.5 (18 Mar 2022 04:25), Max: 36.7 (17 Mar 2022 11:20)  T(F): 97.7 (18 Mar 2022 04:25), Max: 98.1 (17 Mar 2022 11:20)  HR: 67 (18 Mar 2022 04:25) (67 - 68)  BP: 146/76 (18 Mar 2022 04:25) (131/68 - 146/76)  BP(mean): --  RR: 19 (18 Mar 2022 04:25) (18 - 19)  SpO2: 95% (18 Mar 2022 04:25) (92% - 95%)  PHYSICAL EXAM:  GENERAL: NAD,   HEAD:  Atraumatic, Normocephalic  EYES: EOMI, PERRLA, conjunctiva and sclera clear  ENMT: Moist mucous membranes  NECK: Supple,  NERVOUS SYSTEM:  Alert & Oriented X3; Motor Strength 5/5 B/L upper and lower extremities; DTRs 2+ intact and symmetric  CHEST/LUNG: percussion bilaterally    No rales, no  rhonchi,  HEART: Regular rate and rhythm; No murmurs,  no tachy   ABDOMEN: Soft, Nontender, Nondistended; Bowel sounds present  EXTREMITIES:  2+ Peripheral Pulses, No clubbing, cyanosis, or edema  SKIN: No rashes or lesions  total time spend 45 min.

## 2022-03-18 NOTE — PROGRESS NOTE ADULT - ASSESSMENT
85 yo F PMHx HTN, HLD, emphysema (not on home O2), arthritis, melanoma (s/p excision L calf) presents to ED with daughter Rosina for evaluation of progressively worsening dyspnea    5 days of Prednisone total  f/u with Dr Chester Pulm  Pulm rehab referral from Dr Chester    pt with advanced COPD and CHF - 70 pack year smoking hx - chronic SOB and ADORNO - anxious and frustrated  ct chest - GGO - pulm nodule - hiatal hernia - will need f/u with her Pulm MD - Dr Chester for monitoring and surveillance  VBG noted - normal  TTE done - report pending  d dimer normal - neg for PE  I and O  monitor VS and HD and Sat  reassurance  anxiolytics  o2 support as needed - keep sat > 88 pct  Pulm rehab - referral will help with Dyspnea and conditioning
85 yo F PMHx HTN, HLD, emphysema (not on home O2), arthritis, melanoma (s/p excision L calf) presents to ED with daughter Rosina for evaluation of progressively worsening dyspnea    pt with advanced COPD and CHF - 70 pack year smoking hx - chronic SOB and ADORNO - anxious and frustrated  ct chest - GGO - pulm nodule - hiatal hernia - will need f/u with her Pulm MD - Dr Chester for monitoring and surveillance  VBG noted - normal  TTE  d dimer normal - neg for PE  I and O  monitor VS and HD and Sat  reassurance  anxiolytics  o2 support as needed - keep sat > 88 pct  Pulm rehab - referral will help with Dyspnea and conditioning  
87 yo F PMHx HTN, HLD, emphysema (not on home O2), arthritis, melanoma (s/p excision L calf) presents to ED with daughter Rosina for evaluation of progressively worsening dyspnea    pt with advanced COPD and CHF - 70 pack year smoking hx - chronic SOB and ADORNO - anxious and frustrated  ct chest - GGO - pulm nodule - hiatal hernia - will need f/u with her Pulm MD - Dr Chester for monitoring and surveillance  VBG noted - normal  TTE done - report pending  d dimer normal - neg for PE  I and O  monitor VS and HD and Sat  reassurance  anxiolytics  o2 support as needed - keep sat > 88 pct  Pulm rehab - referral will help with Dyspnea and conditioning  
85 yo Female w/ PMHx of HTN, HLD, emphysema (not on home O2), CHF, CVA (2020), arthritis, melanoma s/p excision 2019 presents to ED w/ increasing SOB over the past 10 days, admitted for acute COPD exacerbation most likely 2/2 to URI/mucus plugging
87 yo Female w/ PMHx of HTN, HLD, emphysema (not on home O2), CHF, CVA (2020), arthritis, melanoma s/p excision 2019 presents to ED w/ increasing SOB over the past 10 days, admitted for acute COPD exacerbation most likely 2/2 to URI/mucus plugging

## 2022-03-18 NOTE — DISCHARGE NOTE PROVIDER - CARE PROVIDER_API CALL
Luis Drake)  Internal Medicine; [place] [address]  Phone: [phone]  Fax: [phone]  Follow Up Time:

## 2022-03-18 NOTE — PROGRESS NOTE ADULT - SUBJECTIVE AND OBJECTIVE BOX
Date/Time Patient Seen:  		  Referring MD:   Data Reviewed	       Patient is a 86y old  Female who presents with a chief complaint of COPD exacerbation (16 Mar 2022 10:43)      Subjective/HPI     PAST MEDICAL & SURGICAL HISTORY:  Hypertension    Hypertension    Hypercholesterolemia    Emphysema lung  COPD    Melanoma    Miscarriage  x 2    Arthritis  right knee and shoulders    CVA (cerebrovascular accident)  2020    Breast cancer  left  breast -- lumpectomy in 2004 -- received RT after surgery -- no chemotherapy and prev taking arimadex for 5 years    Arthropathy  left knee replacement in 2006    Bilateral cataracts  with lenses in 2009          Medication list         MEDICATIONS  (STANDING):  atorvastatin 10 milliGRAM(s) Oral at bedtime  budesonide  80 MICROgram(s)/formoterol 4.5 MICROgram(s) Inhaler 2 Puff(s) Inhalation two times a day  diltiazem    milliGRAM(s) Oral daily  enoxaparin Injectable 40 milliGRAM(s) SubCutaneous every 24 hours  predniSONE   Tablet 30 milliGRAM(s) Oral daily  spironolactone 25 milliGRAM(s) Oral daily  tiotropium 18 MICROgram(s) Capsule 1 Capsule(s) Inhalation daily    MEDICATIONS  (PRN):  acetaminophen     Tablet .. 650 milliGRAM(s) Oral every 6 hours PRN Temp greater or equal to 38C (100.4F), Mild Pain (1 - 3)  ALBUTerol    90 MICROgram(s) HFA Inhaler 2 Puff(s) Inhalation every 6 hours PRN Shortness of Breath and/or Wheezing  ALPRAZolam 0.25 milliGRAM(s) Oral every 8 hours PRN anxiety  aluminum hydroxide/magnesium hydroxide/simethicone Suspension 30 milliLiter(s) Oral every 4 hours PRN Dyspepsia  melatonin 3 milliGRAM(s) Oral at bedtime PRN Insomnia  ondansetron Injectable 4 milliGRAM(s) IV Push every 8 hours PRN Nausea and/or Vomiting         Vitals log        ICU Vital Signs Last 24 Hrs  T(C): 36.6 (17 Mar 2022 04:10), Max: 37.2 (16 Mar 2022 19:56)  T(F): 97.8 (17 Mar 2022 04:10), Max: 98.9 (16 Mar 2022 19:56)  HR: 73 (17 Mar 2022 04:10) (65 - 78)  BP: 121/72 (17 Mar 2022 04:10) (117/55 - 123/75)  BP(mean): --  ABP: --  ABP(mean): --  RR: 18 (17 Mar 2022 04:10) (18 - 19)  SpO2: 94% (17 Mar 2022 04:10) (85% - 94%)           Input and Output:  I&O's Detail    15 Mar 2022 07:01  -  16 Mar 2022 07:00  --------------------------------------------------------  IN:  Total IN: 0 mL    OUT:    Voided (mL): 1 mL  Total OUT: 1 mL    Total NET: -1 mL      16 Mar 2022 07:01  -  17 Mar 2022 05:44  --------------------------------------------------------  IN:    IV PiggyBack: 100 mL    Oral Fluid: 360 mL  Total IN: 460 mL    OUT:  Total OUT: 0 mL    Total NET: 460 mL          Lab Data                        14.1   15.89 )-----------( 239      ( 16 Mar 2022 07:24 )             43.2     03-16    141  |  109<H>  |  34<H>  ----------------------------<  162<H>  4.8   |  27  |  1.30    Ca    9.3      16 Mar 2022 07:24    TPro  6.6  /  Alb  3.2<L>  /  TBili  0.3  /  DBili  x   /  AST  11<L>  /  ALT  23  /  AlkPhos  80  03-16    ABG - ( 15 Mar 2022 14:59 )  pH, Arterial: 7.35  pH, Blood: x     /  pCO2: 44    /  pO2: 95    / HCO3: 24    / Base Excess: -1.3  /  SaO2: 97.9                    Review of Systems	      Objective     Physical Examination    heart s1s2  lung dec BS  abd soft      Pertinent Lab findings & Imaging      Kiana:  NO   Adequate UO     I&O's Detail    15 Mar 2022 07:01  -  16 Mar 2022 07:00  --------------------------------------------------------  IN:  Total IN: 0 mL    OUT:    Voided (mL): 1 mL  Total OUT: 1 mL    Total NET: -1 mL      16 Mar 2022 07:01  -  17 Mar 2022 05:44  --------------------------------------------------------  IN:    IV PiggyBack: 100 mL    Oral Fluid: 360 mL  Total IN: 460 mL    OUT:  Total OUT: 0 mL    Total NET: 460 mL               Discussed with:     Cultures:	        Radiology                            
Date/Time Patient Seen:  		  Referring MD:   Data Reviewed	       Patient is a 86y old  Female who presents with a chief complaint of COPD exacerbation (15 Mar 2022 15:56)      Subjective/HPI     PAST MEDICAL & SURGICAL HISTORY:  Hypertension    Hypertension    Hypercholesterolemia    Emphysema lung  COPD    Melanoma    Miscarriage  x 2    Arthritis  right knee and shoulders    CVA (cerebrovascular accident)  2020    Breast cancer  left  breast -- lumpectomy in 2004 -- received RT after surgery -- no chemotherapy and prev taking arimadex for 5 years    Arthropathy  left knee replacement in 2006    Bilateral cataracts  with lenses in 2009          Medication list         MEDICATIONS  (STANDING):  atorvastatin 10 milliGRAM(s) Oral at bedtime  budesonide  80 MICROgram(s)/formoterol 4.5 MICROgram(s) Inhaler 2 Puff(s) Inhalation two times a day  diltiazem    milliGRAM(s) Oral daily  enoxaparin Injectable 40 milliGRAM(s) SubCutaneous every 24 hours  predniSONE   Tablet 30 milliGRAM(s) Oral daily  spironolactone 25 milliGRAM(s) Oral daily  tiotropium 18 MICROgram(s) Capsule 1 Capsule(s) Inhalation daily    MEDICATIONS  (PRN):  acetaminophen     Tablet .. 650 milliGRAM(s) Oral every 6 hours PRN Temp greater or equal to 38C (100.4F), Mild Pain (1 - 3)  ALBUTerol    90 MICROgram(s) HFA Inhaler 2 Puff(s) Inhalation every 6 hours PRN Shortness of Breath and/or Wheezing  ALPRAZolam 0.25 milliGRAM(s) Oral every 8 hours PRN anxiety  aluminum hydroxide/magnesium hydroxide/simethicone Suspension 30 milliLiter(s) Oral every 4 hours PRN Dyspepsia  melatonin 3 milliGRAM(s) Oral at bedtime PRN Insomnia  ondansetron Injectable 4 milliGRAM(s) IV Push every 8 hours PRN Nausea and/or Vomiting         Vitals log        ICU Vital Signs Last 24 Hrs  T(C): 36.7 (16 Mar 2022 04:56), Max: 37 (15 Mar 2022 11:57)  T(F): 98.1 (16 Mar 2022 04:56), Max: 98.6 (15 Mar 2022 11:57)  HR: 77 (16 Mar 2022 04:56) (70 - 91)  BP: 123/75 (16 Mar 2022 04:56) (123/75 - 161/68)  BP(mean): --  ABP: --  ABP(mean): --  RR: 18 (16 Mar 2022 04:56) (18 - 22)  SpO2: 93% (16 Mar 2022 04:56) (87% - 98%)           Input and Output:  I&O's Detail    15 Mar 2022 07:01  -  16 Mar 2022 06:01  --------------------------------------------------------  IN:  Total IN: 0 mL    OUT:    Voided (mL): 1 mL  Total OUT: 1 mL    Total NET: -1 mL          Lab Data                        15.1   8.39  )-----------( 265      ( 15 Mar 2022 12:58 )             46.4     03-15    141  |  109<H>  |  21  ----------------------------<  109<H>  4.8   |  28  |  1.00    Ca    9.5      15 Mar 2022 12:58    TPro  7.1  /  Alb  3.5  /  TBili  0.4  /  DBili  x   /  AST  16  /  ALT  27  /  AlkPhos  90  03-15    ABG - ( 15 Mar 2022 14:59 )  pH, Arterial: 7.35  pH, Blood: x     /  pCO2: 44    /  pO2: 95    / HCO3: 24    / Base Excess: -1.3  /  SaO2: 97.9                    Review of Systems	      Objective     Physical Examination    heart s1s2  lung dec BS  abd soft  head nc      Pertinent Lab findings & Imaging      Kiana:  NO   Adequate UO     I&O's Detail    15 Mar 2022 07:01  -  16 Mar 2022 06:01  --------------------------------------------------------  IN:  Total IN: 0 mL    OUT:    Voided (mL): 1 mL  Total OUT: 1 mL    Total NET: -1 mL               Discussed with:     Cultures:	        Radiology                            
Date/Time Patient Seen:  		  Referring MD:   Data Reviewed	       Patient is a 86y old  Female who presents with a chief complaint of COPD exacerbation (17 Mar 2022 13:23)      Subjective/HPI     PAST MEDICAL & SURGICAL HISTORY:  Hypertension    Hypertension    Hypercholesterolemia    Emphysema lung  COPD    Melanoma    Miscarriage  x 2    Arthritis  right knee and shoulders    CVA (cerebrovascular accident)  2020    Breast cancer  left  breast -- lumpectomy in 2004 -- received RT after surgery -- no chemotherapy and prev taking arimadex for 5 years    Arthropathy  left knee replacement in 2006    Bilateral cataracts  with lenses in 2009          Medication list         MEDICATIONS  (STANDING):  atorvastatin 10 milliGRAM(s) Oral at bedtime  budesonide  80 MICROgram(s)/formoterol 4.5 MICROgram(s) Inhaler 2 Puff(s) Inhalation two times a day  diltiazem    milliGRAM(s) Oral daily  enoxaparin Injectable 40 milliGRAM(s) SubCutaneous every 24 hours  predniSONE   Tablet 30 milliGRAM(s) Oral daily  spironolactone 25 milliGRAM(s) Oral daily  tiotropium 18 MICROgram(s) Capsule 1 Capsule(s) Inhalation daily    MEDICATIONS  (PRN):  acetaminophen     Tablet .. 650 milliGRAM(s) Oral every 6 hours PRN Temp greater or equal to 38C (100.4F), Mild Pain (1 - 3)  ALBUTerol    90 MICROgram(s) HFA Inhaler 2 Puff(s) Inhalation every 6 hours PRN Shortness of Breath and/or Wheezing  ALPRAZolam 0.25 milliGRAM(s) Oral every 8 hours PRN anxiety  aluminum hydroxide/magnesium hydroxide/simethicone Suspension 30 milliLiter(s) Oral every 4 hours PRN Dyspepsia  melatonin 3 milliGRAM(s) Oral at bedtime PRN Insomnia  ondansetron Injectable 4 milliGRAM(s) IV Push every 8 hours PRN Nausea and/or Vomiting         Vitals log        ICU Vital Signs Last 24 Hrs  T(C): 36.5 (18 Mar 2022 04:25), Max: 36.7 (17 Mar 2022 11:20)  T(F): 97.7 (18 Mar 2022 04:25), Max: 98.1 (17 Mar 2022 11:20)  HR: 67 (18 Mar 2022 04:25) (67 - 68)  BP: 146/76 (18 Mar 2022 04:25) (131/68 - 146/76)  BP(mean): --  ABP: --  ABP(mean): --  RR: 19 (18 Mar 2022 04:25) (18 - 19)  SpO2: 95% (18 Mar 2022 04:25) (92% - 95%)           Input and Output:  I&O's Detail    16 Mar 2022 07:01  -  17 Mar 2022 07:00  --------------------------------------------------------  IN:    IV PiggyBack: 100 mL    Oral Fluid: 360 mL  Total IN: 460 mL    OUT:    Voided (mL): 300 mL  Total OUT: 300 mL    Total NET: 160 mL      17 Mar 2022 07:01  -  18 Mar 2022 05:54  --------------------------------------------------------  IN:    Oral Fluid: 400 mL  Total IN: 400 mL    OUT:    Voided (mL): 250 mL  Total OUT: 250 mL    Total NET: 150 mL          Lab Data                        13.9   19.10 )-----------( 255      ( 17 Mar 2022 07:59 )             42.5     03-17    139  |  109<H>  |  43<H>  ----------------------------<  130<H>  5.3   |  25  |  1.10    Ca    9.7      17 Mar 2022 07:59    TPro  6.6  /  Alb  3.2<L>  /  TBili  0.3  /  DBili  x   /  AST  11<L>  /  ALT  23  /  AlkPhos  80  03-16            Review of Systems	      Objective     Physical Examination    heart s1s2  lung dc BS  abd soft      Pertinent Lab findings & Imaging      Kiana:  NO   Adequate UO     I&O's Detail    16 Mar 2022 07:01  -  17 Mar 2022 07:00  --------------------------------------------------------  IN:    IV PiggyBack: 100 mL    Oral Fluid: 360 mL  Total IN: 460 mL    OUT:    Voided (mL): 300 mL  Total OUT: 300 mL    Total NET: 160 mL      17 Mar 2022 07:01  -  18 Mar 2022 05:54  --------------------------------------------------------  IN:    Oral Fluid: 400 mL  Total IN: 400 mL    OUT:    Voided (mL): 250 mL  Total OUT: 250 mL    Total NET: 150 mL               Discussed with:     Cultures:	        Radiology                            
Patient is a 86y old  Female who presents with a chief complaint of COPD exacerbation (16 Mar 2022 06:00)    pt seen and examine today alert awake state sob resolving , no fever , no  cp , tolerating po.   INTERVAL HPI/OVERNIGHT EVENTS:     T(C): 36.7 (03-16-22 @ 04:56), Max: 37 (03-15-22 @ 11:57)  HR: 77 (03-16-22 @ 04:56) (70 - 91)  BP: 123/75 (03-16-22 @ 04:56) (123/75 - 161/68)  RR: 18 (03-16-22 @ 04:56) (18 - 22)  SpO2: 93% (03-16-22 @ 04:56) (87% - 98%)  Wt(kg): --  I&O's Summary    15 Mar 2022 07:01  -  16 Mar 2022 07:00  --------------------------------------------------------  IN: 0 mL / OUT: 1 mL / NET: -1 mL        REVIEW OF SYSTEMS:  CONSTITUTIONAL: No fever, weight loss, + fatigue  EYES: No eye pain, visual disturbances, or discharge  ENMT:   No sinus or throat pain  NECK: No pain or stiffness  BREASTS: No pain, no masses  RESPIRATORY: No cough, wheezing, chills ,  + shortness of breath  CARDIOVASCULAR: No chest pain, palpitations, dizziness, or leg swelling  GASTROINTESTINAL: No abdominal or epigastric pain. No nausea, vomiting,   No diarrhea or constipation. No melena or hematochezia.  GENITOURINARY: No dysuria, frequency, hematuria, or incontinence  NEUROLOGICAL: No headaches, memory loss, loss of strength, numbness,   SKIN: No itching, burning, rashes, or lesions   MUSCULOSKELETAL: No joint pain or swelling; No muscle, back, or extremity pain    PHYSICAL EXAM:  GENERAL: NAD, we  HEAD:  Atraumatic, Normocephalic  EYES: EOMI, PERRLA, conjunctiva and sclera clear  ENMT: No tonsillar erythema, exudates, or enlargement; Moist mucous membranes  NECK: Supple, No JVD  NERVOUS SYSTEM:  Alert & Oriented X3; Motor Strength 5/5 B/L upper and lower extremities; DTRs 2+ intact and symmetric  CHEST/LUNG: percussion bilaterally  decrease bl  mild expiratory  wheez  ; No rales, rhonchi,   HEART: Regular rate and rhythm; No murmurs, rubs, or gallops  ABDOMEN: Soft, Nontender, Nondistended; Bowel sounds present  EXTREMITIES:  2+ Peripheral Pulses, No clubbing, cyanosis, or edema  SKIN: No rashes or lesions    MEDICATIONS  (STANDING):  atorvastatin 10 milliGRAM(s) Oral at bedtime  budesonide  80 MICROgram(s)/formoterol 4.5 MICROgram(s) Inhaler 2 Puff(s) Inhalation two times a day  diltiazem    milliGRAM(s) Oral daily  enoxaparin Injectable 40 milliGRAM(s) SubCutaneous every 24 hours  predniSONE   Tablet 30 milliGRAM(s) Oral daily  spironolactone 25 milliGRAM(s) Oral daily  tiotropium 18 MICROgram(s) Capsule 1 Capsule(s) Inhalation daily    MEDICATIONS  (PRN):  acetaminophen     Tablet .. 650 milliGRAM(s) Oral every 6 hours PRN Temp greater or equal to 38C (100.4F), Mild Pain (1 - 3)  ALBUTerol    90 MICROgram(s) HFA Inhaler 2 Puff(s) Inhalation every 6 hours PRN Shortness of Breath and/or Wheezing  ALPRAZolam 0.25 milliGRAM(s) Oral every 8 hours PRN anxiety  aluminum hydroxide/magnesium hydroxide/simethicone Suspension 30 milliLiter(s) Oral every 4 hours PRN Dyspepsia  melatonin 3 milliGRAM(s) Oral at bedtime PRN Insomnia  ondansetron Injectable 4 milliGRAM(s) IV Push every 8 hours PRN Nausea and/or Vomiting      LABS:                        14.1   15.89 )-----------( 239      ( 16 Mar 2022 07:24 )             43.2     03-16    141  |  109<H>  |  34<H>  ----------------------------<  162<H>  4.8   |  27  |  1.30    Ca    9.3      16 Mar 2022 07:24    TPro  6.6  /  Alb  3.2<L>  /  TBili  0.3  /  DBili  x   /  AST  11<L>  /  ALT  23  /  AlkPhos  80  03-16        CAPILLARY BLOOD GLUCOSE        ABG - ( 15 Mar 2022 14:59 )  pH, Arterial: 7.35  pH, Blood: x     /  pCO2: 44    /  pO2: 95    / HCO3: 24    / Base Excess: -1.3  /  SaO2: 97.9                      RADIOLOGY & ADDITIONAL TESTS:    Imaging Personally Reviewed:     IMPRESSION: Right upper  lobe ground-glass density is again seen. Borders   are less clear than on the prior study but does not overall appear   increased in size.  Increased size of previously seen peripheral right middle lobe nodule.  Resolution of triangular density seen in the left upper lobe on PET scan.   Consider repeat PET CT scan for reevaluation of the right pulmonary   nodules.  Stable left adrenal adenoma  Hiatal hernia  Diverticulosis  Right renal cyst    Advance Directives:  dnr/dni    Palliative Care:  Appropriate    
Patient is a 86y old  Female who presents with a chief complaint of COPD exacerbation (17 Mar 2022 05:44)  pt seen and examine today alert awake state sob   only on exertional , room air pulse ox ambulation 81 ,   94 pulse ox 3lit  , no fever , no  cp .    INTERVAL HPI/OVERNIGHT EVENTS:     T(C): 36.7 (03-17-22 @ 11:20), Max: 37.2 (03-16-22 @ 19:56)  HR: 67 (03-17-22 @ 11:20) (67 - 78)  BP: 131/68 (03-17-22 @ 11:20) (121/72 - 131/68)  RR: 18 (03-17-22 @ 11:20) (18 - 18)  SpO2: 92% (03-17-22 @ 11:20) (85% - 94%)  Wt(kg): --  I&O's Summary    16 Mar 2022 07:01  -  17 Mar 2022 07:00  --------------------------------------------------------  IN: 460 mL / OUT: 300 mL / NET: 160 mL    17 Mar 2022 07:01  -  17 Mar 2022 13:24  --------------------------------------------------------  IN: 200 mL / OUT: 250 mL / NET: -50 mL        REVIEW OF SYSTEMS:  CONSTITUTIONAL: No fever, weight loss, or fatigue  EYES: No eye pain, visual disturbances, or discharge  ENMT:  No difficulty hearing, tinnitus, vertigo  ; No sinus or throat pain  NECK: No pain or stiffness  BREASTS: No pain, no masses  RESPIRATORY: No cough, wheezing, chills s; exertional  + shortness of breath  CARDIOVASCULAR: No chest pain, palpitations, dizziness, or leg swelling  GASTROINTESTINAL: No abdominal or epigastric pain. No nausea, vomiting,    No diarrhea or constipation. No melena  GENITOURINARY: No dysuria, frequency, hematuria, or incontinence  NEUROLOGICAL: No headaches, memory loss, loss of strength, numbness, or tremors  SKIN: No itching, burning, rashes, or lesions   MUSCULOSKELETAL: No joint pain or swelling; No muscle, back, or extremity pain    PHYSICAL EXAM:  GENERAL: NAD,   HEAD:  Atraumatic, Normocephalic  EYES: EOMI, PERRLA, conjunctiva and sclera clear  ENMT: Moist mucous membranes  NECK: Supple,  NERVOUS SYSTEM:  Alert & Oriented X3; Motor Strength 5/5 B/L upper and lower extremities; DTRs 2+ intact and symmetric  CHEST/LUNG: percussion bilaterally  decrease  lower lobe  No rales, no  rhonchi,  HEART: Regular rate and rhythm; No murmurs,  no tachy   ABDOMEN: Soft, Nontender, Nondistended; Bowel sounds present  EXTREMITIES:  2+ Peripheral Pulses, No clubbing, cyanosis, or edema  SKIN: No rashes or lesions    MEDICATIONS  (STANDING):  atorvastatin 10 milliGRAM(s) Oral at bedtime  budesonide  80 MICROgram(s)/formoterol 4.5 MICROgram(s) Inhaler 2 Puff(s) Inhalation two times a day  diltiazem    milliGRAM(s) Oral daily  enoxaparin Injectable 40 milliGRAM(s) SubCutaneous every 24 hours  predniSONE   Tablet 30 milliGRAM(s) Oral daily  spironolactone 25 milliGRAM(s) Oral daily  tiotropium 18 MICROgram(s) Capsule 1 Capsule(s) Inhalation daily    MEDICATIONS  (PRN):  acetaminophen     Tablet .. 650 milliGRAM(s) Oral every 6 hours PRN Temp greater or equal to 38C (100.4F), Mild Pain (1 - 3)  ALBUTerol    90 MICROgram(s) HFA Inhaler 2 Puff(s) Inhalation every 6 hours PRN Shortness of Breath and/or Wheezing  ALPRAZolam 0.25 milliGRAM(s) Oral every 8 hours PRN anxiety  aluminum hydroxide/magnesium hydroxide/simethicone Suspension 30 milliLiter(s) Oral every 4 hours PRN Dyspepsia  melatonin 3 milliGRAM(s) Oral at bedtime PRN Insomnia  ondansetron Injectable 4 milliGRAM(s) IV Push every 8 hours PRN Nausea and/or Vomiting      LABS:                        13.9   19.10 )-----------( 255      ( 17 Mar 2022 07:59 )             42.5     03-17    139  |  109<H>  |  43<H>  ----------------------------<  130<H>  5.3   |  25  |  1.10    Ca    9.7      17 Mar 2022 07:59    TPro  6.6  /  Alb  3.2<L>  /  TBili  0.3  /  DBili  x   /  AST  11<L>  /  ALT  23  /  AlkPhos  80  03-16              ABG - ( 15 Mar 2022 14:59 )  pH, Arterial: 7.35  pH, Blood: x     /  pCO2: 44    /  pO2: 95    / HCO3: 24    / Base Excess: -1.3  /  SaO2: 97.9                      RADIOLOGY & ADDITIONAL TESTS:    Imaging Personally Reviewed:     no new test   Advance Directives: dnr    Palliative Care:  Appropriate

## 2022-07-13 NOTE — PHYSICAL THERAPY INITIAL EVALUATION ADULT - STANDING BALANCE: STATIC
Detail Level: Zone
General Sunscreen Counseling: I recommended a broad spectrum sunscreen with a SPF of 30 or higher.  I explained that SPF 30 sunscreens block approximately 97 percent of the sun's harmful rays.
fair plus

## 2022-10-16 NOTE — ED PROVIDER NOTE - NS ED MD TWO NIGHTS YN
You can access the FollowMyHealth Patient Portal offered by [facility] [place] by registering at the following website: [email or web address]. By joining [facility]’s FollowMyHealth portal, you will also be able to view your health information using other applications (apps) compatible with our system. Yes

## 2023-06-13 NOTE — ED PROVIDER NOTE - IV ALTEPLASE EXCL ABS HIDDEN
[facility] sent over a pre-op form  It doesn't say when the surgery is but he is scheduled for a pe on 6/26  I put the form in the nurses box  show

## 2024-01-24 PROBLEM — I63.9 CEREBRAL INFARCTION, UNSPECIFIED: Chronic | Status: ACTIVE | Noted: 2022-03-15

## 2024-02-01 ENCOUNTER — APPOINTMENT (OUTPATIENT)
Dept: CT IMAGING | Facility: HOSPITAL | Age: 89
End: 2024-02-01

## 2024-02-01 ENCOUNTER — RESULT REVIEW (OUTPATIENT)
Age: 89
End: 2024-02-01

## 2024-02-01 ENCOUNTER — OUTPATIENT (OUTPATIENT)
Dept: OUTPATIENT SERVICES | Facility: HOSPITAL | Age: 89
LOS: 1 days | End: 2024-02-01
Payer: COMMERCIAL

## 2024-02-01 DIAGNOSIS — H26.9 UNSPECIFIED CATARACT: Chronic | ICD-10-CM

## 2024-02-01 DIAGNOSIS — M12.9 ARTHROPATHY, UNSPECIFIED: Chronic | ICD-10-CM

## 2024-02-01 DIAGNOSIS — C50.919 MALIGNANT NEOPLASM OF UNSPECIFIED SITE OF UNSPECIFIED FEMALE BREAST: Chronic | ICD-10-CM

## 2024-02-01 DIAGNOSIS — R93.89 ABNORMAL FINDINGS ON DIAGNOSTIC IMAGING OF OTHER SPECIFIED BODY STRUCTURES: ICD-10-CM

## 2024-02-01 DIAGNOSIS — Z00.00 ENCOUNTER FOR GENERAL ADULT MEDICAL EXAMINATION WITHOUT ABNORMAL FINDINGS: ICD-10-CM

## 2024-02-01 LAB
APTT BLD: 31.5 SEC — SIGNIFICANT CHANGE UP (ref 24.5–35.6)
BASOPHILS # BLD AUTO: 0.07 K/UL — SIGNIFICANT CHANGE UP (ref 0–0.2)
BASOPHILS NFR BLD AUTO: 0.9 % — SIGNIFICANT CHANGE UP (ref 0–2)
EOSINOPHIL # BLD AUTO: 0.12 K/UL — SIGNIFICANT CHANGE UP (ref 0–0.5)
EOSINOPHIL NFR BLD AUTO: 1.5 % — SIGNIFICANT CHANGE UP (ref 0–6)
HCT VFR BLD CALC: 42.3 % — SIGNIFICANT CHANGE UP (ref 34.5–45)
HGB BLD-MCNC: 14.1 G/DL — SIGNIFICANT CHANGE UP (ref 11.5–15.5)
IMM GRANULOCYTES NFR BLD AUTO: 0.3 % — SIGNIFICANT CHANGE UP (ref 0–0.9)
INR BLD: 0.89 RATIO — SIGNIFICANT CHANGE UP (ref 0.85–1.18)
LYMPHOCYTES # BLD AUTO: 1.86 K/UL — SIGNIFICANT CHANGE UP (ref 1–3.3)
LYMPHOCYTES # BLD AUTO: 23.9 % — SIGNIFICANT CHANGE UP (ref 13–44)
MCHC RBC-ENTMCNC: 32.8 PG — SIGNIFICANT CHANGE UP (ref 27–34)
MCHC RBC-ENTMCNC: 33.3 GM/DL — SIGNIFICANT CHANGE UP (ref 32–36)
MCV RBC AUTO: 98.4 FL — SIGNIFICANT CHANGE UP (ref 80–100)
MONOCYTES # BLD AUTO: 0.54 K/UL — SIGNIFICANT CHANGE UP (ref 0–0.9)
MONOCYTES NFR BLD AUTO: 6.9 % — SIGNIFICANT CHANGE UP (ref 2–14)
NEUTROPHILS # BLD AUTO: 5.17 K/UL — SIGNIFICANT CHANGE UP (ref 1.8–7.4)
NEUTROPHILS NFR BLD AUTO: 66.5 % — SIGNIFICANT CHANGE UP (ref 43–77)
NRBC # BLD: 0 /100 WBCS — SIGNIFICANT CHANGE UP (ref 0–0)
PLATELET # BLD AUTO: 246 K/UL — SIGNIFICANT CHANGE UP (ref 150–400)
PROTHROM AB SERPL-ACNC: 9.8 SEC — SIGNIFICANT CHANGE UP (ref 9.5–13)
RBC # BLD: 4.3 M/UL — SIGNIFICANT CHANGE UP (ref 3.8–5.2)
RBC # FLD: 13.1 % — SIGNIFICANT CHANGE UP (ref 10.3–14.5)
WBC # BLD: 7.78 K/UL — SIGNIFICANT CHANGE UP (ref 3.8–10.5)
WBC # FLD AUTO: 7.78 K/UL — SIGNIFICANT CHANGE UP (ref 3.8–10.5)

## 2024-02-01 PROCEDURE — 85610 PROTHROMBIN TIME: CPT

## 2024-02-01 PROCEDURE — 88341 IMHCHEM/IMCYTCHM EA ADD ANTB: CPT | Mod: 26

## 2024-02-01 PROCEDURE — 88172 CYTP DX EVAL FNA 1ST EA SITE: CPT

## 2024-02-01 PROCEDURE — 88341 IMHCHEM/IMCYTCHM EA ADD ANTB: CPT

## 2024-02-01 PROCEDURE — 88173 CYTOPATH EVAL FNA REPORT: CPT | Mod: 26

## 2024-02-01 PROCEDURE — 85730 THROMBOPLASTIN TIME PARTIAL: CPT

## 2024-02-01 PROCEDURE — 38505 NEEDLE BIOPSY LYMPH NODES: CPT

## 2024-02-01 PROCEDURE — 38505 NEEDLE BIOPSY LYMPH NODES: CPT | Mod: LT

## 2024-02-01 PROCEDURE — 36415 COLL VENOUS BLD VENIPUNCTURE: CPT

## 2024-02-01 PROCEDURE — 77012 CT SCAN FOR NEEDLE BIOPSY: CPT

## 2024-02-01 PROCEDURE — 77012 CT SCAN FOR NEEDLE BIOPSY: CPT | Mod: 26

## 2024-02-01 PROCEDURE — 85025 COMPLETE CBC W/AUTO DIFF WBC: CPT

## 2024-02-01 PROCEDURE — 81288 MLH1 GENE: CPT

## 2024-02-01 PROCEDURE — 88342 IMHCHEM/IMCYTCHM 1ST ANTB: CPT

## 2024-02-01 PROCEDURE — 88381 MICRODISSECTION MANUAL: CPT | Mod: 26

## 2024-02-01 PROCEDURE — 88173 CYTOPATH EVAL FNA REPORT: CPT

## 2024-02-01 PROCEDURE — 88342 IMHCHEM/IMCYTCHM 1ST ANTB: CPT | Mod: 26

## 2024-02-01 PROCEDURE — 88305 TISSUE EXAM BY PATHOLOGIST: CPT | Mod: 26

## 2024-02-01 PROCEDURE — 88305 TISSUE EXAM BY PATHOLOGIST: CPT

## 2024-02-01 PROCEDURE — 88381 MICRODISSECTION MANUAL: CPT

## 2024-02-05 LAB — NON-GYNECOLOGICAL CYTOLOGY STUDY: SIGNIFICANT CHANGE UP

## 2024-03-13 ENCOUNTER — OUTPATIENT (OUTPATIENT)
Dept: OUTPATIENT SERVICES | Facility: HOSPITAL | Age: 89
LOS: 1 days | End: 2024-03-13
Payer: MEDICARE

## 2024-03-13 ENCOUNTER — RESULT REVIEW (OUTPATIENT)
Age: 89
End: 2024-03-13

## 2024-03-13 ENCOUNTER — APPOINTMENT (OUTPATIENT)
Dept: CV DIAGNOSITCS | Facility: HOSPITAL | Age: 89
End: 2024-03-13

## 2024-03-13 DIAGNOSIS — C50.919 MALIGNANT NEOPLASM OF UNSPECIFIED SITE OF UNSPECIFIED FEMALE BREAST: Chronic | ICD-10-CM

## 2024-03-13 DIAGNOSIS — H26.9 UNSPECIFIED CATARACT: Chronic | ICD-10-CM

## 2024-03-13 DIAGNOSIS — R93.89 ABNORMAL FINDINGS ON DIAGNOSTIC IMAGING OF OTHER SPECIFIED BODY STRUCTURES: ICD-10-CM

## 2024-03-13 DIAGNOSIS — M12.9 ARTHROPATHY, UNSPECIFIED: Chronic | ICD-10-CM

## 2024-03-13 PROCEDURE — 93306 TTE W/DOPPLER COMPLETE: CPT | Mod: 26

## 2025-01-30 NOTE — PATIENT PROFILE ADULT - DISASTER - NSPRESCRALCFREQ_GEN_A_NUR
Patient states last night she fell and hit her head. Patient states she has a migraine. Patient takes ASA daily. Patient alert and oriented.   
Monthly or less

## 2025-07-11 NOTE — PROGRESS NOTE ADULT - PROBLEM SELECTOR PROBLEM 3
CVA (cerebral vascular accident)
CVA (cerebral vascular accident)
PAST MEDICAL HISTORY:  Chronic cholecystitis     Elevated liver enzymes     Gallbladder polyp     History of epistaxis     Mild type 1 von Willebrand disease     NAFLD (nonalcoholic fatty liver disease)